# Patient Record
Sex: FEMALE | Race: BLACK OR AFRICAN AMERICAN | NOT HISPANIC OR LATINO | Employment: FULL TIME
[De-identification: names, ages, dates, MRNs, and addresses within clinical notes are randomized per-mention and may not be internally consistent; named-entity substitution may affect disease eponyms.]

---

## 2017-09-24 ENCOUNTER — HEALTH MAINTENANCE LETTER (OUTPATIENT)
Age: 52
End: 2017-09-24

## 2020-09-16 ENCOUNTER — APPOINTMENT (OUTPATIENT)
Dept: CT IMAGING | Facility: CLINIC | Age: 55
End: 2020-09-16
Attending: EMERGENCY MEDICINE
Payer: COMMERCIAL

## 2020-09-16 ENCOUNTER — HOSPITAL ENCOUNTER (EMERGENCY)
Facility: CLINIC | Age: 55
Discharge: HOME | End: 2020-09-16
Attending: EMERGENCY MEDICINE | Admitting: EMERGENCY MEDICINE
Payer: COMMERCIAL

## 2020-09-16 VITALS
RESPIRATION RATE: 18 BRPM | SYSTOLIC BLOOD PRESSURE: 130 MMHG | TEMPERATURE: 97.5 F | WEIGHT: 209.88 LBS | OXYGEN SATURATION: 98 % | HEIGHT: 65 IN | BODY MASS INDEX: 34.97 KG/M2 | DIASTOLIC BLOOD PRESSURE: 79 MMHG | HEART RATE: 67 BPM

## 2020-09-16 DIAGNOSIS — R11.2 NAUSEA VOMITING AND DIARRHEA: ICD-10-CM

## 2020-09-16 DIAGNOSIS — K52.9 NON-SPECIFIC COLITIS: ICD-10-CM

## 2020-09-16 DIAGNOSIS — R19.7 NAUSEA VOMITING AND DIARRHEA: ICD-10-CM

## 2020-09-16 LAB
ALBUMIN SERPL-MCNC: 4.1 G/DL (ref 3.4–5)
ALP SERPL-CCNC: 116 U/L (ref 40–150)
ALT SERPL W P-5'-P-CCNC: 21 U/L (ref 0–50)
ANION GAP SERPL CALCULATED.3IONS-SCNC: 8 MMOL/L (ref 3–14)
AST SERPL W P-5'-P-CCNC: 20 U/L (ref 0–45)
BASOPHILS # BLD AUTO: 0 10E9/L (ref 0–0.2)
BASOPHILS NFR BLD AUTO: 0.2 %
BILIRUB SERPL-MCNC: 0.5 MG/DL (ref 0.2–1.3)
BUN SERPL-MCNC: 15 MG/DL (ref 7–30)
CALCIUM SERPL-MCNC: 9.5 MG/DL (ref 8.5–10.1)
CHLORIDE SERPL-SCNC: 104 MMOL/L (ref 94–109)
CO2 SERPL-SCNC: 26 MMOL/L (ref 20–32)
CREAT SERPL-MCNC: 0.78 MG/DL (ref 0.52–1.04)
DIFFERENTIAL METHOD BLD: NORMAL
EOSINOPHIL # BLD AUTO: 0 10E9/L (ref 0–0.7)
EOSINOPHIL NFR BLD AUTO: 0.2 %
ERYTHROCYTE [DISTWIDTH] IN BLOOD BY AUTOMATED COUNT: 13.2 % (ref 10–15)
GFR SERPL CREATININE-BSD FRML MDRD: 86 ML/MIN/{1.73_M2}
GLUCOSE SERPL-MCNC: 123 MG/DL (ref 70–99)
HCT VFR BLD AUTO: 41.7 % (ref 35–47)
HGB BLD-MCNC: 14 G/DL (ref 11.7–15.7)
IMM GRANULOCYTES # BLD: 0 10E9/L (ref 0–0.4)
IMM GRANULOCYTES NFR BLD: 0.2 %
LIPASE SERPL-CCNC: 110 U/L (ref 73–393)
LYMPHOCYTES # BLD AUTO: 1.3 10E9/L (ref 0.8–5.3)
LYMPHOCYTES NFR BLD AUTO: 13.7 %
MCH RBC QN AUTO: 29.9 PG (ref 26.5–33)
MCHC RBC AUTO-ENTMCNC: 33.6 G/DL (ref 31.5–36.5)
MCV RBC AUTO: 89 FL (ref 78–100)
MONOCYTES # BLD AUTO: 0.4 10E9/L (ref 0–1.3)
MONOCYTES NFR BLD AUTO: 4.1 %
NEUTROPHILS # BLD AUTO: 7.6 10E9/L (ref 1.6–8.3)
NEUTROPHILS NFR BLD AUTO: 81.6 %
NRBC # BLD AUTO: 0 10*3/UL
NRBC BLD AUTO-RTO: 0 /100
PLATELET # BLD AUTO: 395 10E9/L (ref 150–450)
POTASSIUM SERPL-SCNC: 3.5 MMOL/L (ref 3.4–5.3)
PROT SERPL-MCNC: 8.8 G/DL (ref 6.8–8.8)
RBC # BLD AUTO: 4.69 10E12/L (ref 3.8–5.2)
SODIUM SERPL-SCNC: 138 MMOL/L (ref 133–144)
TSH SERPL DL<=0.005 MIU/L-ACNC: 1.35 MU/L (ref 0.4–4)
WBC # BLD AUTO: 9.3 10E9/L (ref 4–11)

## 2020-09-16 PROCEDURE — 25000125 ZZHC RX 250: Performed by: EMERGENCY MEDICINE

## 2020-09-16 PROCEDURE — 99285 EMERGENCY DEPT VISIT HI MDM: CPT | Mod: 25

## 2020-09-16 PROCEDURE — 80053 COMPREHEN METABOLIC PANEL: CPT | Performed by: EMERGENCY MEDICINE

## 2020-09-16 PROCEDURE — 96374 THER/PROPH/DIAG INJ IV PUSH: CPT | Mod: 59

## 2020-09-16 PROCEDURE — 25000132 ZZH RX MED GY IP 250 OP 250 PS 637: Performed by: EMERGENCY MEDICINE

## 2020-09-16 PROCEDURE — 96375 TX/PRO/DX INJ NEW DRUG ADDON: CPT

## 2020-09-16 PROCEDURE — 25800030 ZZH RX IP 258 OP 636: Performed by: EMERGENCY MEDICINE

## 2020-09-16 PROCEDURE — 83690 ASSAY OF LIPASE: CPT | Performed by: EMERGENCY MEDICINE

## 2020-09-16 PROCEDURE — 25000128 H RX IP 250 OP 636: Performed by: EMERGENCY MEDICINE

## 2020-09-16 PROCEDURE — 96361 HYDRATE IV INFUSION ADD-ON: CPT

## 2020-09-16 PROCEDURE — 85025 COMPLETE CBC W/AUTO DIFF WBC: CPT | Performed by: EMERGENCY MEDICINE

## 2020-09-16 PROCEDURE — 84443 ASSAY THYROID STIM HORMONE: CPT | Performed by: EMERGENCY MEDICINE

## 2020-09-16 PROCEDURE — 74177 CT ABD & PELVIS W/CONTRAST: CPT

## 2020-09-16 RX ORDER — ONDANSETRON 2 MG/ML
4 INJECTION INTRAMUSCULAR; INTRAVENOUS ONCE
Status: COMPLETED | OUTPATIENT
Start: 2020-09-16 | End: 2020-09-16

## 2020-09-16 RX ORDER — ONDANSETRON 4 MG/1
4 TABLET, ORALLY DISINTEGRATING ORAL EVERY 6 HOURS PRN
Qty: 10 TABLET | Refills: 0 | Status: ON HOLD | OUTPATIENT
Start: 2020-09-16 | End: 2020-09-18

## 2020-09-16 RX ORDER — DICYCLOMINE HCL 20 MG
20 TABLET ORAL ONCE
Status: COMPLETED | OUTPATIENT
Start: 2020-09-16 | End: 2020-09-16

## 2020-09-16 RX ORDER — HYDROMORPHONE HYDROCHLORIDE 1 MG/ML
0.5 INJECTION, SOLUTION INTRAMUSCULAR; INTRAVENOUS; SUBCUTANEOUS ONCE
Status: COMPLETED | OUTPATIENT
Start: 2020-09-16 | End: 2020-09-16

## 2020-09-16 RX ORDER — IOPAMIDOL 755 MG/ML
105 INJECTION, SOLUTION INTRAVASCULAR ONCE
Status: COMPLETED | OUTPATIENT
Start: 2020-09-16 | End: 2020-09-16

## 2020-09-16 RX ORDER — KETOROLAC TROMETHAMINE 15 MG/ML
15 INJECTION, SOLUTION INTRAMUSCULAR; INTRAVENOUS ONCE
Status: COMPLETED | OUTPATIENT
Start: 2020-09-16 | End: 2020-09-16

## 2020-09-16 RX ADMIN — LIDOCAINE HYDROCHLORIDE 30 ML: 20 SOLUTION ORAL; TOPICAL at 15:45

## 2020-09-16 RX ADMIN — DICYCLOMINE HYDROCHLORIDE 20 MG: 20 TABLET ORAL at 17:13

## 2020-09-16 RX ADMIN — HYDROMORPHONE HYDROCHLORIDE 0.5 MG: 1 INJECTION, SOLUTION INTRAMUSCULAR; INTRAVENOUS; SUBCUTANEOUS at 18:02

## 2020-09-16 RX ADMIN — SODIUM CHLORIDE 70 ML: 9 INJECTION, SOLUTION INTRAVENOUS at 17:34

## 2020-09-16 RX ADMIN — SODIUM CHLORIDE 1000 ML: 9 INJECTION, SOLUTION INTRAVENOUS at 14:46

## 2020-09-16 RX ADMIN — IOPAMIDOL 105 ML: 755 INJECTION, SOLUTION INTRAVENOUS at 17:34

## 2020-09-16 RX ADMIN — ONDANSETRON 4 MG: 2 INJECTION INTRAMUSCULAR; INTRAVENOUS at 14:49

## 2020-09-16 RX ADMIN — KETOROLAC TROMETHAMINE 15 MG: 15 INJECTION, SOLUTION INTRAMUSCULAR; INTRAVENOUS at 14:46

## 2020-09-16 ASSESSMENT — ENCOUNTER SYMPTOMS
VOMITING: 1
NAUSEA: 1
FEVER: 0
ABDOMINAL DISTENTION: 1
DIARRHEA: 1

## 2020-09-16 ASSESSMENT — MIFFLIN-ST. JEOR: SCORE: 1557.88

## 2020-09-16 NOTE — ED TRIAGE NOTES
Per daughter with pt pt has been vomiting, nauseated, and stomach pain since last night. Sx similar to a hyperthyroid crisis 4 years ago

## 2020-09-16 NOTE — ED AVS SNAPSHOT
Emergency Department  [address] [place] [id number]  Phone:  [phone]  Fax:  [phone]                                    Anna Martinez   MRN: [id number]    Department:   Emergency Department   Date of Visit:  9/16/2020           After Visit Summary Signature Page    I have received my discharge instructions, and my questions have been answered. I have discussed any challenges I see with this plan with the nurse or doctor.    ..........................................................................................................................................  Patient/Patient Representative Signature      ..........................................................................................................................................  Patient Representative Print Name and Relationship to Patient    ..................................................               ................................................  Date                                   Time    ..........................................................................................................................................  Reviewed by Signature/Title    ...................................................              ..............................................  Date                                               Time          22EPIC Rev 08/18

## 2020-09-16 NOTE — DISCHARGE INSTRUCTIONS
Return to the emergency department or seek medical care as instructed if your symptoms fail to improve or significantly worsen.    Take Acetaminophen (aka Tylenol) and/or ibuprofen (aka Motrin/Advil) as needed for symptom/pain relief; use as directed.    Take zofran as need for nausea; use as directed.    Take Imodium as needed for diarrhea    Follow-up as indicated on page 1.  Maintain adequate hydration and get plenty of rest.

## 2020-09-16 NOTE — ED PROVIDER NOTES
History   Chief Complaint  Nausea, Vomiting, & Diarrhea    The history is provided by the patient.      Anna Martinez is a 55 year old female with a history of hyperthyroidism who presents for evaluation of mid abdominal pain accompanied by nausea, emesis, and watery, loose diarrhea that began in the middle of the night last night. She rates the pain as an 8/10 in severity. She originally thought this was food poisoning, however that pain has continued to increase in severity.  The patient states she has been emitting frequently and had 4 episodes of emesis in triage. She only coughs when emitting. She has also recently started menopause and has been going through 1 tampon every 3 hours for the past 4 days. She denies fevers or hematemesis. No known sick contact. The patient states this feels similar to her hyperthyroidism episode.     Allergies  No Known Allergies    Medications  The patient is not currently taking any prescribed medications.    Past Medical History  Hyperthyroidism  Adjustment disorder  Scabies  Grave's disease     Past Surgical History  History reviewed. No pertinent surgical history.    Family History  History reviewed. No pertinent family history.    Social History  The patient was unaccompanied to the ED.  Smoking Status: light smoker, cigars and pipe  Smokeless Tobacco: never  Alcohol Use: no  Drug Use: no    Review of Systems   Constitutional: Negative for fever.   Gastrointestinal: Positive for abdominal distention, diarrhea, nausea and vomiting.   Genitourinary: Positive for vaginal bleeding.   All other systems reviewed and are negative.    Physical Exam     Patient Vitals for the past 24 hrs:   BP Temp Temp src Pulse Resp SpO2 Height Weight   09/16/20 1840 -- -- -- -- -- 97 % -- --   09/16/20 1749 -- -- -- -- -- 100 % -- --   09/16/20 1748 (!) 158/71 -- -- 63 -- -- -- --   09/16/20 1700 -- -- -- -- 18 100 % -- --   09/16/20 1600 -- -- -- -- -- 100 % -- --   09/16/20 1341 135/70 97.5  " F (36.4  C) Oral 81 28 97 % 1.651 m (5' 5\") 95.2 kg (209 lb 14.1 oz)       Physical Exam    General: Alert and cooperative with exam. Patient in moderate distress. Normal mentation.  Head:  Scalp is NC/AT  Eyes:  No scleral icterus, PERRL  ENT:  The external nose and ears are normal. The oropharynx is normal and without erythema; mucus membranes are moist.  Neck:  Normal range of motion without rigidity.  CV:  Regular rate and rhythm    No pathologic murmur   Resp:  Breath sounds are clear bilaterally    Non-labored, no retractions or accessory muscle use  GI:  Mild diffuse abdominal tenderness greatest in epigastric area. Abdomen is soft, no distension. No peritoneal signs  MS:  No lower extremity edema   Skin:  Warm and dry, No rash or lesions noted.  Neuro: Oriented x 3. No gross motor deficits.    Emergency Department Course   Imaging:  Radiology findings were communicated with the patient who voiced understanding of the findings.    CT abdomen pelvis w contrast:  IMPRESSION:   1.  Diffuse colonic inflammation consistent with an infectious or  inflammatory colitis. No associated abscess or free intraperitoneal  air. No other acute changes in the abdomen or pelvis to account for  patient's symptoms.   Readings per Radiology    Laboratory:  Laboratory findings were communicated with the patient who voiced understanding of the findings.    CBC: AWNL (WBC 9.3, HGB 14.0, )  CMP: glucose 123 (H) o/w WNL (Creatinine 0.78)  Lipase: 110  TSH with free T4 reflex: 1.35    Interventions:  1446 NS 1L IV Bolus  1446 Toradol 15 mg IV  1449 Zofran 4 mg IV  1545 GI cocktail 30 mL PO  1713 Bentyl 20 mg PO  1802 Dilaudid 0.5 mg IV    Emergency Department Course:  Past medical records, nursing notes, and vitals reviewed.    1434 I physically examined the patient as documented above.    IV was inserted and blood was drawn for laboratory testing, results above.    The patient was sent for radiographs while in the emergency " department, results above.     1835 I rechecked the patient and discussed the findings of their workup thus far.     Findings and plan explained to the Patient. Patient discharged home with instructions regarding supportive care, medications, and reasons to return. The importance of close follow-up was reviewed. The patient was prescribed Zofran.     I personally reviewed the laboratory and imaging results with the Patient and answered all related questions prior to discharge.     Impression & Plan   Medical Decision Making:  This patient presented to the Emergency Department with nausea and vomiting.  The clinical exam today is non-specific and non-focal and non-surgical.  The laboratory testing has returned normal.  The differential diagnosis of nausea is protean and includes:  Bowel Obstruction, Ulcer, Ischemia, Cholecystitis, Diverticulitis, Pancreatitis, UTI, Pyelonephritis, Enteritis/Colitis, amongst many other etiologies.  The sequela of vomiting includes electrolyte abnormalities and dehydration.  Fluids were given and labs were normal.  The history, physical exam, and results detect no life threatening cause at this time, nor do they indicate the patient is currently suffering from one of the previously mentioned conditions.  Patient was provided Zofran, GI cocktail, Bentyl, and Toradol with mild improvement in symptoms.  She then experienced complete resolution of pain after receiving Dilaudid.  CT scan was obtained and demonstrates nonspecific colitis as noted above.  I recommended continued supportive care and provided prescription for Zofran for nausea/vomiting.  Patient is to follow-up closely with PCP if symptoms not improving.  Return precautions discussed.  Patient discharged home.    Diagnosis:    ICD-10-CM    1. Nausea vomiting and diarrhea  R11.2     R19.7    2. Non-specific colitis  K52.9      Disposition:  Discharged to home.    Discharge Medications:  New Prescriptions    ONDANSETRON (ZOFRAN  ODT) 4 MG ODT TAB    Take 1 tablet (4 mg) by mouth every 6 hours as needed for nausea or vomiting       Scribe Disclosure:  I, Antonia Yepez, am serving as a scribe at 2:32 PM on 9/16/2020 to document services personally performed by Alexx Cooper DO based on my observations and the provider's statements to me.      Alexx Cooper DO  09/17/20 [id number]

## 2020-09-18 ENCOUNTER — HOSPITAL ENCOUNTER (OUTPATIENT)
Facility: CLINIC | Age: 55
Setting detail: OBSERVATION
Discharge: HOME | End: 2020-09-20
Attending: EMERGENCY MEDICINE | Admitting: INTERNAL MEDICINE
Payer: COMMERCIAL

## 2020-09-18 DIAGNOSIS — K52.9 COLITIS: ICD-10-CM

## 2020-09-18 DIAGNOSIS — R11.2 INTRACTABLE VOMITING WITH NAUSEA, UNSPECIFIED VOMITING TYPE: ICD-10-CM

## 2020-09-18 LAB
ALBUMIN SERPL-MCNC: 3.5 G/DL (ref 3.4–5)
ALP SERPL-CCNC: 81 U/L (ref 40–150)
ALT SERPL W P-5'-P-CCNC: 19 U/L (ref 0–50)
ANION GAP SERPL CALCULATED.3IONS-SCNC: 6 MMOL/L (ref 3–14)
AST SERPL W P-5'-P-CCNC: 19 U/L (ref 0–45)
BASOPHILS # BLD AUTO: 0 10E9/L (ref 0–0.2)
BASOPHILS NFR BLD AUTO: 0.4 %
BILIRUB SERPL-MCNC: 1.3 MG/DL (ref 0.2–1.3)
BUN SERPL-MCNC: 9 MG/DL (ref 7–30)
CALCIUM SERPL-MCNC: 8.7 MG/DL (ref 8.5–10.1)
CHLORIDE SERPL-SCNC: 103 MMOL/L (ref 94–109)
CO2 SERPL-SCNC: 25 MMOL/L (ref 20–32)
CREAT SERPL-MCNC: 0.82 MG/DL (ref 0.52–1.04)
DIFFERENTIAL METHOD BLD: NORMAL
EOSINOPHIL # BLD AUTO: 0 10E9/L (ref 0–0.7)
EOSINOPHIL NFR BLD AUTO: 0.1 %
ERYTHROCYTE [DISTWIDTH] IN BLOOD BY AUTOMATED COUNT: 13 % (ref 10–15)
GFR SERPL CREATININE-BSD FRML MDRD: 81 ML/MIN/{1.73_M2}
GLUCOSE SERPL-MCNC: 122 MG/DL (ref 70–99)
HCT VFR BLD AUTO: 37.7 % (ref 35–47)
HGB BLD-MCNC: 13 G/DL (ref 11.7–15.7)
IMM GRANULOCYTES # BLD: 0 10E9/L (ref 0–0.4)
IMM GRANULOCYTES NFR BLD: 0.1 %
LACTATE BLD-SCNC: 1.4 MMOL/L (ref 0.7–2)
LIPASE SERPL-CCNC: 132 U/L (ref 73–393)
LYMPHOCYTES # BLD AUTO: 2 10E9/L (ref 0.8–5.3)
LYMPHOCYTES NFR BLD AUTO: 28.3 %
MCH RBC QN AUTO: 30.4 PG (ref 26.5–33)
MCHC RBC AUTO-ENTMCNC: 34.5 G/DL (ref 31.5–36.5)
MCV RBC AUTO: 88 FL (ref 78–100)
MONOCYTES # BLD AUTO: 0.6 10E9/L (ref 0–1.3)
MONOCYTES NFR BLD AUTO: 7.8 %
NEUTROPHILS # BLD AUTO: 4.6 10E9/L (ref 1.6–8.3)
NEUTROPHILS NFR BLD AUTO: 63.3 %
NRBC # BLD AUTO: 0 10*3/UL
NRBC BLD AUTO-RTO: 0 /100
PLATELET # BLD AUTO: 360 10E9/L (ref 150–450)
POTASSIUM SERPL-SCNC: 3.3 MMOL/L (ref 3.4–5.3)
PROT SERPL-MCNC: 7.7 G/DL (ref 6.8–8.8)
RBC # BLD AUTO: 4.27 10E12/L (ref 3.8–5.2)
SODIUM SERPL-SCNC: 134 MMOL/L (ref 133–144)
WBC # BLD AUTO: 7.2 10E9/L (ref 4–11)

## 2020-09-18 PROCEDURE — 96361 HYDRATE IV INFUSION ADD-ON: CPT

## 2020-09-18 PROCEDURE — 96374 THER/PROPH/DIAG INJ IV PUSH: CPT

## 2020-09-18 PROCEDURE — 85025 COMPLETE CBC W/AUTO DIFF WBC: CPT | Performed by: EMERGENCY MEDICINE

## 2020-09-18 PROCEDURE — 25000132 ZZH RX MED GY IP 250 OP 250 PS 637: Performed by: INTERNAL MEDICINE

## 2020-09-18 PROCEDURE — G0378 HOSPITAL OBSERVATION PER HR: HCPCS

## 2020-09-18 PROCEDURE — C9803 HOPD COVID-19 SPEC COLLECT: HCPCS

## 2020-09-18 PROCEDURE — 96375 TX/PRO/DX INJ NEW DRUG ADDON: CPT

## 2020-09-18 PROCEDURE — 83690 ASSAY OF LIPASE: CPT | Performed by: EMERGENCY MEDICINE

## 2020-09-18 PROCEDURE — 99285 EMERGENCY DEPT VISIT HI MDM: CPT | Mod: 25

## 2020-09-18 PROCEDURE — 96376 TX/PRO/DX INJ SAME DRUG ADON: CPT

## 2020-09-18 PROCEDURE — 80053 COMPREHEN METABOLIC PANEL: CPT | Performed by: EMERGENCY MEDICINE

## 2020-09-18 PROCEDURE — 25000128 H RX IP 250 OP 636: Performed by: INTERNAL MEDICINE

## 2020-09-18 PROCEDURE — U0003 INFECTIOUS AGENT DETECTION BY NUCLEIC ACID (DNA OR RNA); SEVERE ACUTE RESPIRATORY SYNDROME CORONAVIRUS 2 (SARS-COV-2) (CORONAVIRUS DISEASE [COVID-19]), AMPLIFIED PROBE TECHNIQUE, MAKING USE OF HIGH THROUGHPUT TECHNOLOGIES AS DESCRIBED BY CMS-2020-01-R: HCPCS | Performed by: EMERGENCY MEDICINE

## 2020-09-18 PROCEDURE — 99220 ZZC INITIAL OBSERVATION CARE,LEVL III: CPT | Performed by: INTERNAL MEDICINE

## 2020-09-18 PROCEDURE — 83605 ASSAY OF LACTIC ACID: CPT | Performed by: EMERGENCY MEDICINE

## 2020-09-18 PROCEDURE — 25000132 ZZH RX MED GY IP 250 OP 250 PS 637: Performed by: EMERGENCY MEDICINE

## 2020-09-18 PROCEDURE — 25000128 H RX IP 250 OP 636: Performed by: EMERGENCY MEDICINE

## 2020-09-18 PROCEDURE — 25800030 ZZH RX IP 258 OP 636: Performed by: EMERGENCY MEDICINE

## 2020-09-18 RX ORDER — ACETAMINOPHEN 500 MG
1000 TABLET ORAL EVERY 6 HOURS PRN
Qty: 30 TABLET | Refills: 0 | Status: SHIPPED | OUTPATIENT
Start: 2020-09-18 | End: 2020-09-25

## 2020-09-18 RX ORDER — POTASSIUM CL/LIDO/0.9 % NACL 10MEQ/0.1L
10 INTRAVENOUS SOLUTION, PIGGYBACK (ML) INTRAVENOUS
Status: DISCONTINUED | OUTPATIENT
Start: 2020-09-18 | End: 2020-09-20 | Stop reason: HOSPADM

## 2020-09-18 RX ORDER — LIDOCAINE 40 MG/G
CREAM TOPICAL
Status: DISCONTINUED | OUTPATIENT
Start: 2020-09-18 | End: 2020-09-20 | Stop reason: HOSPADM

## 2020-09-18 RX ORDER — NICOTINE 21 MG/24HR
1 PATCH, TRANSDERMAL 24 HOURS TRANSDERMAL DAILY
Status: DISCONTINUED | OUTPATIENT
Start: 2020-09-18 | End: 2020-09-20 | Stop reason: HOSPADM

## 2020-09-18 RX ORDER — SODIUM CHLORIDE 9 MG/ML
INJECTION, SOLUTION INTRAVENOUS CONTINUOUS
Status: DISCONTINUED | OUTPATIENT
Start: 2020-09-18 | End: 2020-09-18

## 2020-09-18 RX ORDER — POTASSIUM CHLORIDE 1.5 G/1.58G
20-40 POWDER, FOR SOLUTION ORAL
Status: DISCONTINUED | OUTPATIENT
Start: 2020-09-18 | End: 2020-09-20 | Stop reason: HOSPADM

## 2020-09-18 RX ORDER — HYDROCODONE BITARTRATE AND ACETAMINOPHEN 5; 325 MG/1; MG/1
2 TABLET ORAL ONCE
Status: COMPLETED | OUTPATIENT
Start: 2020-09-18 | End: 2020-09-18

## 2020-09-18 RX ORDER — ONDANSETRON 4 MG/1
4 TABLET, ORALLY DISINTEGRATING ORAL EVERY 8 HOURS PRN
Qty: 10 TABLET | Refills: 0 | Status: SHIPPED | OUTPATIENT
Start: 2020-09-18 | End: 2020-09-20

## 2020-09-18 RX ORDER — CIPROFLOXACIN 2 MG/ML
400 INJECTION, SOLUTION INTRAVENOUS EVERY 12 HOURS
Status: DISCONTINUED | OUTPATIENT
Start: 2020-09-18 | End: 2020-09-20 | Stop reason: HOSPADM

## 2020-09-18 RX ORDER — OXYCODONE HYDROCHLORIDE 5 MG/1
5-10 TABLET ORAL
Status: DISCONTINUED | OUTPATIENT
Start: 2020-09-18 | End: 2020-09-20 | Stop reason: HOSPADM

## 2020-09-18 RX ORDER — ONDANSETRON 2 MG/ML
4 INJECTION INTRAMUSCULAR; INTRAVENOUS EVERY 30 MIN PRN
Status: DISCONTINUED | OUTPATIENT
Start: 2020-09-18 | End: 2020-09-18

## 2020-09-18 RX ORDER — ONDANSETRON 2 MG/ML
4 INJECTION INTRAMUSCULAR; INTRAVENOUS EVERY 6 HOURS PRN
Status: DISCONTINUED | OUTPATIENT
Start: 2020-09-18 | End: 2020-09-20 | Stop reason: HOSPADM

## 2020-09-18 RX ORDER — ACETAMINOPHEN 325 MG/1
650 TABLET ORAL EVERY 4 HOURS PRN
Status: DISCONTINUED | OUTPATIENT
Start: 2020-09-18 | End: 2020-09-20 | Stop reason: HOSPADM

## 2020-09-18 RX ORDER — POTASSIUM CHLORIDE 7.45 MG/ML
10 INJECTION INTRAVENOUS
Status: DISCONTINUED | OUTPATIENT
Start: 2020-09-18 | End: 2020-09-20 | Stop reason: HOSPADM

## 2020-09-18 RX ORDER — AMOXICILLIN 250 MG
2 CAPSULE ORAL 2 TIMES DAILY PRN
Status: DISCONTINUED | OUTPATIENT
Start: 2020-09-18 | End: 2020-09-20 | Stop reason: HOSPADM

## 2020-09-18 RX ORDER — HYDROMORPHONE HYDROCHLORIDE 1 MG/ML
0.5 INJECTION, SOLUTION INTRAMUSCULAR; INTRAVENOUS; SUBCUTANEOUS
Status: COMPLETED | OUTPATIENT
Start: 2020-09-18 | End: 2020-09-18

## 2020-09-18 RX ORDER — NALOXONE HYDROCHLORIDE 0.4 MG/ML
.1-.4 INJECTION, SOLUTION INTRAMUSCULAR; INTRAVENOUS; SUBCUTANEOUS
Status: DISCONTINUED | OUTPATIENT
Start: 2020-09-18 | End: 2020-09-20 | Stop reason: HOSPADM

## 2020-09-18 RX ORDER — ONDANSETRON 4 MG/1
4 TABLET, ORALLY DISINTEGRATING ORAL EVERY 6 HOURS PRN
Status: DISCONTINUED | OUTPATIENT
Start: 2020-09-18 | End: 2020-09-20 | Stop reason: HOSPADM

## 2020-09-18 RX ORDER — ACETAMINOPHEN 650 MG/1
650 SUPPOSITORY RECTAL EVERY 4 HOURS PRN
Status: DISCONTINUED | OUTPATIENT
Start: 2020-09-18 | End: 2020-09-20 | Stop reason: HOSPADM

## 2020-09-18 RX ORDER — AMOXICILLIN 250 MG
1 CAPSULE ORAL 2 TIMES DAILY PRN
Status: DISCONTINUED | OUTPATIENT
Start: 2020-09-18 | End: 2020-09-20 | Stop reason: HOSPADM

## 2020-09-18 RX ORDER — METRONIDAZOLE 500 MG/1
500 TABLET ORAL 4 TIMES DAILY
Qty: 28 TABLET | Refills: 0 | Status: SHIPPED | OUTPATIENT
Start: 2020-09-18 | End: 2020-09-20

## 2020-09-18 RX ORDER — CIPROFLOXACIN 500 MG/1
500 TABLET, FILM COATED ORAL 2 TIMES DAILY
Qty: 14 TABLET | Refills: 0 | Status: SHIPPED | OUTPATIENT
Start: 2020-09-18 | End: 2020-09-20

## 2020-09-18 RX ORDER — POTASSIUM CHLORIDE 29.8 MG/ML
20 INJECTION INTRAVENOUS
Status: DISCONTINUED | OUTPATIENT
Start: 2020-09-18 | End: 2020-09-20 | Stop reason: HOSPADM

## 2020-09-18 RX ORDER — SODIUM CHLORIDE AND POTASSIUM CHLORIDE 150; 900 MG/100ML; MG/100ML
INJECTION, SOLUTION INTRAVENOUS CONTINUOUS
Status: DISCONTINUED | OUTPATIENT
Start: 2020-09-18 | End: 2020-09-20 | Stop reason: HOSPADM

## 2020-09-18 RX ORDER — POTASSIUM CHLORIDE 1500 MG/1
20-40 TABLET, EXTENDED RELEASE ORAL
Status: DISCONTINUED | OUTPATIENT
Start: 2020-09-18 | End: 2020-09-20 | Stop reason: HOSPADM

## 2020-09-18 RX ORDER — HYDROMORPHONE HYDROCHLORIDE 1 MG/ML
.2-.4 INJECTION, SOLUTION INTRAMUSCULAR; INTRAVENOUS; SUBCUTANEOUS
Status: DISCONTINUED | OUTPATIENT
Start: 2020-09-18 | End: 2020-09-20 | Stop reason: HOSPADM

## 2020-09-18 RX ADMIN — SODIUM CHLORIDE 1000 ML: 9 INJECTION, SOLUTION INTRAVENOUS at 08:44

## 2020-09-18 RX ADMIN — HYDROMORPHONE HYDROCHLORIDE 0.5 MG: 1 INJECTION, SOLUTION INTRAMUSCULAR; INTRAVENOUS; SUBCUTANEOUS at 08:44

## 2020-09-18 RX ADMIN — NICOTINE 1 PATCH: 14 PATCH, EXTENDED RELEASE TRANSDERMAL at 17:09

## 2020-09-18 RX ADMIN — POTASSIUM CHLORIDE AND SODIUM CHLORIDE: 900; 150 INJECTION, SOLUTION INTRAVENOUS at 18:11

## 2020-09-18 RX ADMIN — METRONIDAZOLE 500 MG: 500 INJECTION, SOLUTION INTRAVENOUS at 17:02

## 2020-09-18 RX ADMIN — METRONIDAZOLE 500 MG: 500 INJECTION, SOLUTION INTRAVENOUS at 22:21

## 2020-09-18 RX ADMIN — CIPROFLOXACIN 400 MG: 2 INJECTION, SOLUTION INTRAVENOUS at 13:32

## 2020-09-18 RX ADMIN — ONDANSETRON 4 MG: 2 INJECTION INTRAMUSCULAR; INTRAVENOUS at 08:44

## 2020-09-18 RX ADMIN — OXYCODONE HYDROCHLORIDE 10 MG: 5 TABLET ORAL at 22:20

## 2020-09-18 RX ADMIN — HYDROCODONE BITARTRATE AND ACETAMINOPHEN 2 TABLET: 5; 325 TABLET ORAL at 11:08

## 2020-09-18 RX ADMIN — OXYCODONE HYDROCHLORIDE 5 MG: 5 TABLET ORAL at 19:24

## 2020-09-18 ASSESSMENT — ENCOUNTER SYMPTOMS
ABDOMINAL PAIN: 1
DYSURIA: 0

## 2020-09-18 ASSESSMENT — MIFFLIN-ST. JEOR: SCORE: 1558.43

## 2020-09-18 NOTE — PROGRESS NOTES
Observation goals  PRIOR TO DISCHARGE     Comments: -diagnostic tests and consults completed and resulted Pending  -vital signs normal or at patient baseline Met  -tolerating oral intake to maintain hydration Met  Nurse to notify provider when observation goals have been met and patient is ready for discharge      Pt is stable, A/OX4. Independent in room. Minimal pain in abdominal area. Awaiting pelvis ultrasound. Getting IV abx. Will continue to monitor.

## 2020-09-18 NOTE — ED NOTES
"[facility]  ED Nurse Handoff Report    ED Chief complaint: Abdominal Pain      ED Diagnosis:   Final diagnoses:   Colitis   Intractable vomiting with nausea, unspecified vomiting type       Code Status: Full Code    Allergies: No Known Allergies    Patient Story: Patient was recently here  2 days ago and found to have colitis. Now having pain that is uncontrolled. Staying for observation.   Focused Assessment:      Treatments and/or interventions provided: Pain medications.   Patient's response to treatments and/or interventions: Pain improved with meds.     To be done/followed up on inpatient unit:  none    Does this patient have any cognitive concerns?: none    Activity level - Baseline/Home:  Independent  Activity Level - Current:   Independent    Patient's Preferred language: English   Needed?: No    Isolation: No  Infection: Not Applicable  Bariatric?: No    Vital Signs:   Vitals:    09/18/20 0801 09/18/20 0900 09/18/20 1002   BP: (!) 168/64  127/76   Pulse: 70     Resp: 18     Temp: 97.4  F (36.3  C)     TempSrc: Temporal     SpO2: 100% 98% 98%   Weight: 95.3 kg (210 lb)     Height: 1.651 m (5' 5\")         Cardiac Rhythm:     Was the PSS-3 completed:   Yes  What interventions are required if any?               Family Comments: none  OBS brochure/video discussed/provided to patient/family: Yes              Name of person given brochure if not patient: none              Relationship to patient: none    For the majority of the shift this patient's behavior was Green.   Behavioral interventions performed were none.    ED NURSE PHONE NUMBER: [phone]         "

## 2020-09-18 NOTE — ED NOTES
Patient has full relief of pain. Given crackers, apple juice and applesauce and patient attempting to eat.

## 2020-09-18 NOTE — PROGRESS NOTES
RECEIVING UNIT ED HANDOFF REVIEW    ED Nurse Handoff Report was reviewed by: OFELIA MICHEL RN on September 18, 2020 at 1:05 PM

## 2020-09-18 NOTE — ED PROVIDER NOTES
"History     Chief Complaint:  Abdominal Pain       The history is provided by the patient.     Anna Martinez is a 55 year old female with a history of hypothyroidism, Grave's disease, adjustment disorder, and scabies who presents for evaluation of abdominal pain. The patient reports that she has had constant non radiating abdominal pain for the past two days and she has been unable to get any relief. She denies vaginal bleeding or dysuria. The patient does note that she recently had her first period in over a year, with heavy bleeding. Of note, she was seen on 9/16 with the same symptoms and CT was obtained with findings below.     CT Abdomen and Pelvis with Contrast from 9/16/2020:  IMPRESSION:   1.  Diffuse colonic inflammation consistent with an infectious or  inflammatory colitis. No associated abscess or free intraperitoneal  air. No other acute changes in the abdomen or pelvis to account for  patient's symptoms.  Reading per radiology.    Allergies  No Known Allergies     Medications  The patient is not currently taking any prescribed medications.     Past Medical History  Hyperthyroidism  Adjustment disorder  Scabies  Grave's disease      Past Surgical History  History reviewed. No pertinent surgical history.     Family History  History reviewed. No pertinent family history.     Social History  The patient was unaccompanied to the ED.  Smoking Status: light smoker, cigars and pipe  Smokeless Tobacco: Never  Alcohol Use: No  Drug Use: No      Review of Systems   Gastrointestinal: Positive for abdominal pain.   Genitourinary: Negative for dysuria and vaginal bleeding.   All other systems reviewed and are negative.      Physical Exam     Patient Vitals for the past 24 hrs:   BP Temp Temp src Pulse Resp SpO2 Height Weight   09/18/20 0801 (!) 168/64 97.4  F (36.3  C) Temporal 70 18 100 % 1.651 m (5' 5\") 95.3 kg (210 lb)          Physical Exam  Vitals: reviewed by me  General: Pt seen on [facility], " pleasant, cooperative, and alert to conversation  Eyes: Tracking well, clear conjunctiva BL  ENT: MMM, midline trachea.   Lungs: No tachypnea, no accessory muscle use. No respiratory distress.   CV: Rate as above, regular rhythm.    Abd: Soft, mild tenderness to palpation throughout, but no focal area of significant tenderness.  No guarding, no rebound x4.  MSK: no peripheral edema or joint effusion.  No evidence of trauma  Skin: No rash, normal turgor and temperature  Neuro: Clear speech and no facial droop.  Psych: Not RIS, no e/o AH/VH      Emergency Department Course     Laboratory:  Laboratory findings were communicated with the patient who voiced understanding of the findings.    CBC: WBC 7.2, HGB 13.0,   CMP: Potassium 3.3 (L), Glucose 122 (H), (Creatinine 0.82) o/w WNL   Lipase: 132  Lactic Acid (Resulted 0854): 1.4    Asymptomatic COVID-19 (Coronavirus) PCR by Nasopharyngeal Swab: Pending      Interventions:   0844 Normal Saline 1000 mL IV   0844 Zofran 4 mg IV   0844 Dilaudid 0.5 mg IV   1108 Norco 325 mg, 2 tablets PO    Emergency Department Course:    0807 Nursing notes and vitals reviewed.    0820 I performed an exam of the patient as documented above.     0847 IV was inserted and blood was drawn for laboratory testing, results above.    Patient rechecked and updated.      1042 Patient rechecked and updated.      The patient's nose was swabbed and this sample was sent for COVID testing, findings above.      1210  I consulted with Dr. Adair of the hospitalist services. They are in agreement to accept the patient for admission. Findings and plan explained to the Patient who consents to admission. Discussed the patient with Dr. Adair, who will admit the patient to an observation bed for further monitoring, evaluation, and treatment.    Impression & Plan     Medical Decision Making:  She is a very pleasant 55 year old female who presents to the emergency room with abdominal pain with some vomiting,  diagnosed with non specific colitis on a CT scan several days ago. Here in the ER, after one dose of pain medication she feels comfortable going home, and is tolerating orals. Her abdomen is benign in all four quadrants at time of disposition.  I had initially intended on outpatient therapy, but patient tells me she does not feel comfortable with outpatient management as she is afraid she will have another very terrible night tonight as she did last night with her pain and possible chills.  With this in mind, we will plan for admission to the care of Dr. Curry, who is kindly agreed accept care of the patient.  I do think it is prudent to broaden our treatment strategy and give antibiotics to cover for possible infectious cause of her colitis, though it is reassuring that she has no fever or white count even days later.  Will plan for admission of the next available inpatient bed.    Covid-19  Anna Martinez was evaluated during a global COVID-19 pandemic, which necessitated consideration that the patient might be at risk for infection with the SARS-CoV-2 virus that causes COVID-19.   Applicable protocols for evaluation were followed during the patient's care.   COVID-19 was considered as part of the patient's evaluation. The plan for testing is:  a test was obtained during this visit.     Diagnosis:     ICD-10-CM    1. Colitis  K52.9    2. Intractable vomiting with nausea, unspecified vomiting type  R11.2         Disposition:  Admitted to Dr. Adair.    Scribe Disclosure:  Yumi SIERRA, am serving as a scribe at 8:13 AM on 9/18/2020 to document services personally performed by Chris Champion MD based on my observations and the provider's statements to me.      Chris Champion MD  09/18/20 [id number]

## 2020-09-18 NOTE — PHARMACY-ADMISSION MEDICATION HISTORY
Pharmacy Medication History  Admission medication history interview status for the 9/18/2020  admission is complete. See EPIC admission navigator for prior to admission medications     Medication history sources: Patient  Medication history source reliability: Good  Adherence assessment: Good    Significant changes made to the medication list:  Per patient she only takes multivitamin at home      Additional medication history information:       Medication reconciliation completed by provider prior to medication history? No    Time spent in this activity: 5min       Prior to Admission medications    Medication Sig Last Dose Taking? Auth Provider   acetaminophen (TYLENOL) 500 MG tablet Take 2 tablets (1,000 mg) by mouth every 6 hours as needed for mild pain  Yes Chris Champion MD   ciprofloxacin (CIPRO) 500 MG tablet Take 1 tablet (500 mg) by mouth 2 times daily for 7 days  Yes Chris Champion MD   metroNIDAZOLE (FLAGYL) 500 MG tablet Take 1 tablet (500 mg) by mouth 4 times daily for 7 days  Yes Chris Champion MD   Multiple Vitamins-Minerals (CENTRUM) TABS Take one tablet by mouth once daily 9/17/2020 at Unknown time Yes Gia Ortiz MD   ondansetron (ZOFRAN ODT) 4 MG ODT tab Take 1 tablet (4 mg) by mouth every 8 hours as needed  Yes Chris Champion MD Marci Jacobson PharmD

## 2020-09-18 NOTE — H&P
Admitted:     09/18/2020      CHIEF COMPLAINT:  Abdominal pain, nausea, vomiting and diarrhea.      HISTORY OF PRESENT ILLNESS:  History has been obtained from the patient who is a good historian.      Mrs. Anna Martinez is an extremely pleasant 55-year-old female with a past medical history of hyperthyroidism/Graves' disease status post iodine intake, who presented for evaluation of abdominal pain, nausea, vomiting, and she also reports having vaginal bleeding for the last few days.      The patient states that she had menopause for the last year or so.  She states that Saturday, while she was at work, she started having some abdominal cramping, and then she noticed having heavy vaginal bleeding.  She states that she did not have any vaginal bleeding for the last few months.  It felt like her menstrual cycle but heavier.      She reports that on Tuesday to Wednesday night, she started having GI symptoms of nausea, multiple episodes of emesis and watery loose diarrhea, along with ongoing abdominal pain/cramping.  She was seen in the ER on 09/16/2020.  Her vitals were stable at that time.  Blood work was unremarkable.  Her white blood cells were 9.3.  She had a CT of the abdomen and pelvis with IV contrast, which showed a diffuse colonic inflammation, consistent with infectious or inflammatory colitis.  Also, she has associated abscess and free intraperitoneal air.  She was given some IV fluids at that time, along with a GI cocktail, Zofran IV, 1 dose of Dilaudid IV and Toradol.  She reported feeling better.  She was discharged home with a prescription for Zofran p.r.n.      The patient states that she continued to have abdominal cramping associated with nausea and vomiting.  Actually, now she has more dry heaving.  She reports she cannot tolerate oral intake.  She continues to have significant diarrhea, maybe 6-7 times daily.  She describes it as watery.  She did see a small amount of blood in the stool a few days  ago but none since then.      Upon further questioning, she states that she did not check her temperature at home, but she did feel hot and sweaty at times.  She denies any chest pain, no headache.  She does report feeling some shortness of breath.  There is no reported dysuria, no leg swelling.      She presented again to the ER today because of these ongoing symptoms.  Of note, her vaginal bleeding seems to have slowed down since then.  She was seen by Dr. Champion.  Her vitals showed a blood pressure of 168/64; later on blood pressure was 127/76, heart rate 70, respiratory rate 18, oxygen saturation 98% on room air, temperature 97.4.  She did have done another set of blood work.  Her BMP showed a sodium of 134, potassium 3.3, chloride 103, bicarbonate 25, BUN 9, creatinine 0.82.  Her anion gap was 6, albumin 3.5, total protein 7.7, total bilirubin 1.3, alkaline phosphatase 81, ALT 19, AST 19.  Lactic acid 1.4.  Her lipase was 132, glucose 122.  White blood cells of 7.2, hemoglobin 13.0, hematocrit 37.7 and platelet count 360.  In ER, she received a bolus of normal saline, 1 dose of Zofran, 1 dose of Dilaudid IV, and 1 dose of Norco 2 tablets.  Initially, ER attending thought that she may be discharged from ER, but the patient did not feel well enough to go home, worrying that she might continue to have ongoing cramping, nausea, vomiting and diarrhea, and admission to the hospital for observation was recommended.      PAST MEDICAL HISTORY:   1.  Hyperthyroidism, status post iodine intake.  Her TSH 2 days ago was 1.35.   2.  Adjustment disorder.   3.  Tobacco use disorder.      PAST SURGICAL HISTORY:   No past surgical history on file.     FAMILY HISTORY:    Diabetes Brother       Breast Cancer Maternal Aunt       Diabetes Maternal Grandmother       Lung Cancer Maternal Grandmother       Diabetes Maternal Uncle       Diabetes Mother            SOCIAL HISTORY:  Currently, she smokes cigars/pipe.  She denies  illicit drug abuse.  She denies alcohol drinking.      PRIOR TO ADMISSION MEDICATIONS:    acetaminophen (TYLENOL) 500 MG tablet Take 2 tablets (1,000 mg) by mouth every 6 hours as needed for mild pain   Yes Chris Champion MD   Multiple Vitamins-Minerals (CENTRUM) TABS Take one tablet by mouth once daily 9/17/2020 at Unknown time Yes Gia Ortiz MD          ALLERGIES:  NO KNOWN DRUG ALLERGIES.      REVIEW OF SYSTEMS:  The 10-point review of systems was conducted, and it was negative except for pertinent positives mentioned in history of present illness.      PHYSICAL EXAMINATION:   VITAL SIGNS:  Blood pressure is 127/76, heart rate 70, respiratory rate 18, oxygen saturation 98% on room air and temperature 97.4.   GENERAL:  The patient is awake, alert, in no acute distress at the time of my examination.   HEENT:  Normocephalic, atraumatic.  Pupils are equally round and reactive to light.  Oral mucosa is dry.   NECK:  Supple.  No cervical lymphadenopathy, no thyromegaly.   CHEST:  There is bilateral air entry.  No wheezing, no rales, no crackles.   CARDIOVASCULAR:  There is normal S1, S2.  Regular rate and rhythm.  No murmurs, no rubs.   ABDOMEN:  Soft, nontender, nondistended.  Bowel sounds are present.   EXTREMITIES:  There is no leg swelling, no calf tenderness, 2+ peripheral pulses are palpable.   SKIN:  Intact, no rash, no cyanosis.   NEUROLOGIC:  The patient is awake, alert and oriented to self, place and time.  There are no focal neurological deficits.   PSYCHIATRIC:  Normal mood, normal affect.   MUSCULOSKELETAL:  She moves all extremities freely.  There are no obvious joint deformities.      LABORATORY DATA:  Reviewed and dictated above.      ASSESSMENT:  Mrs. Anna Martinez is a very pleasant 55-year-old female with a past medical history of hyperthyroidism/Graves' disease, asthma, and tobacco use disorder who came in for evaluation of nausea, vomiting, abdominal pain, and  diarrhea.      PLAN:   1.  Acute colitis, unclear etiology, infectious versus inflammatory colitis.      She reports that she started having abdominal cramping last Saturday, and then she started having nausea, multiple episodes of emesis and multiple episodes of diarrhea, starting Tuesday.  This is her second ER visit.  Initially, 2 days ago in the ER, her blood work was unremarkable, and CT of the abdomen and pelvis showed diffuse colonic inflammation, consistent with infectious or inflammatory colitis.  She denies sick contacts.  She was discharged home from ER.  At that time she was feeling better after initial intervention in the ER.  At home, she continued to have increased abdominal cramping, nausea, dry heaving and diarrhea.  The blood work here is again pretty unremarkable.  No fever, no leukocytosis.  Her potassium is 3.3.  Her lactic acid is 1.4, which is reassuring.  Given her ongoing symptoms, I am going to start her on IV Cipro and IV Flagyl at this time.  We will monitor fever curve and white blood cells.  We will start her on a clear liquid diet and advance as tolerated.  We will start her on IV fluids, antiemetics p.r.n., and pain medications p.r.n.  We will replace potassium as needed.   2.  Hypokalemia.  Potassium 3.3.  Potassium replacement protocol has been ordered.   3.  History of hyperthyroidism.  Her TSH is normal at 1.35.  She is not on any medications currently.   4.  Vaginal bleeding.  She reports being in menopause for almost 1 year.  She started having heavy vaginal bleeding last Saturday associated with this abdominal cramping.  She reported vaginal bleeding actually is slowing down now.  A CT of the abdomen and pelvis few days ago showed multiple small uterine fibroids.  No pelvic mass.  Her hemoglobin is 13, down from 14 two days ago.  Her platelet count is normal.  We will plan to have a pelvic ultrasound at this time, and she should follow-up with GYN as outpatient.  Her vaginal  bleeding might be related to her fibroids.   5.  Tobacco use disorder.  I strongly encouraged her to quit smoking.  A nicotine patch had been ordered at this time.   6.  Deep venous thrombosis prophylaxis:  Encouraged ambulation as I anticipate a short hospital stay.      CODE STATUS:  Discussed with the patient, and patient is full code.      DISPOSITION:  Admit to observational status.  Anticipate patient may be discharged in next 1-2 days, pending improvement of her GI symptoms and tolerating diet.         ALEXSANDRA PETE MD             D: 2020   T: 2020   MT:       Name:     ELIJAH ALCALA   MRN:      [id number]        Account:      [id number]   :      1965        Admitted:     2020                   Document: [id number]       cc: Ana CHAMBERLAIN

## 2020-09-19 ENCOUNTER — APPOINTMENT (OUTPATIENT)
Dept: ULTRASOUND IMAGING | Facility: CLINIC | Age: 55
End: 2020-09-19
Attending: INTERNAL MEDICINE
Payer: COMMERCIAL

## 2020-09-19 LAB
ANION GAP SERPL CALCULATED.3IONS-SCNC: 2 MMOL/L (ref 3–14)
BUN SERPL-MCNC: 7 MG/DL (ref 7–30)
CALCIUM SERPL-MCNC: 8.6 MG/DL (ref 8.5–10.1)
CHLORIDE SERPL-SCNC: 109 MMOL/L (ref 94–109)
CO2 SERPL-SCNC: 29 MMOL/L (ref 20–32)
CREAT SERPL-MCNC: 0.79 MG/DL (ref 0.52–1.04)
ERYTHROCYTE [DISTWIDTH] IN BLOOD BY AUTOMATED COUNT: 13.2 % (ref 10–15)
GFR SERPL CREATININE-BSD FRML MDRD: 85 ML/MIN/{1.73_M2}
GLUCOSE BLDC GLUCOMTR-MCNC: 88 MG/DL (ref 70–99)
GLUCOSE SERPL-MCNC: 105 MG/DL (ref 70–99)
HCT VFR BLD AUTO: 35.7 % (ref 35–47)
HGB BLD-MCNC: 11.7 G/DL (ref 11.7–15.7)
MCH RBC QN AUTO: 29.4 PG (ref 26.5–33)
MCHC RBC AUTO-ENTMCNC: 32.8 G/DL (ref 31.5–36.5)
MCV RBC AUTO: 90 FL (ref 78–100)
PLATELET # BLD AUTO: 342 10E9/L (ref 150–450)
POTASSIUM SERPL-SCNC: 3.5 MMOL/L (ref 3.4–5.3)
RBC # BLD AUTO: 3.98 10E12/L (ref 3.8–5.2)
SARS-COV-2 RNA SPEC QL NAA+PROBE: NOT DETECTED
SODIUM SERPL-SCNC: 140 MMOL/L (ref 133–144)
SPECIMEN SOURCE: NORMAL
WBC # BLD AUTO: 7.8 10E9/L (ref 4–11)

## 2020-09-19 PROCEDURE — 25000128 H RX IP 250 OP 636: Performed by: INTERNAL MEDICINE

## 2020-09-19 PROCEDURE — 00000146 ZZHCL STATISTIC GLUCOSE BY METER IP

## 2020-09-19 PROCEDURE — 25000132 ZZH RX MED GY IP 250 OP 250 PS 637: Performed by: INTERNAL MEDICINE

## 2020-09-19 PROCEDURE — 36415 COLL VENOUS BLD VENIPUNCTURE: CPT | Performed by: INTERNAL MEDICINE

## 2020-09-19 PROCEDURE — 85027 COMPLETE CBC AUTOMATED: CPT | Performed by: INTERNAL MEDICINE

## 2020-09-19 PROCEDURE — 76856 US EXAM PELVIC COMPLETE: CPT

## 2020-09-19 PROCEDURE — G0378 HOSPITAL OBSERVATION PER HR: HCPCS

## 2020-09-19 PROCEDURE — 99226 ZZC SUBSEQUENT OBSERVATION CARE,LEVEL III: CPT | Performed by: INTERNAL MEDICINE

## 2020-09-19 PROCEDURE — 80048 BASIC METABOLIC PNL TOTAL CA: CPT | Performed by: INTERNAL MEDICINE

## 2020-09-19 PROCEDURE — 96376 TX/PRO/DX INJ SAME DRUG ADON: CPT

## 2020-09-19 RX ADMIN — METRONIDAZOLE 500 MG: 500 INJECTION, SOLUTION INTRAVENOUS at 15:21

## 2020-09-19 RX ADMIN — NICOTINE 1 PATCH: 14 PATCH, EXTENDED RELEASE TRANSDERMAL at 10:24

## 2020-09-19 RX ADMIN — ACETAMINOPHEN 650 MG: 325 TABLET, FILM COATED ORAL at 17:11

## 2020-09-19 RX ADMIN — METRONIDAZOLE 500 MG: 500 INJECTION, SOLUTION INTRAVENOUS at 05:36

## 2020-09-19 RX ADMIN — ONDANSETRON 4 MG: 2 INJECTION INTRAMUSCULAR; INTRAVENOUS at 03:06

## 2020-09-19 RX ADMIN — CIPROFLOXACIN 400 MG: 2 INJECTION, SOLUTION INTRAVENOUS at 13:08

## 2020-09-19 RX ADMIN — METRONIDAZOLE 500 MG: 500 INJECTION, SOLUTION INTRAVENOUS at 21:31

## 2020-09-19 RX ADMIN — CIPROFLOXACIN 400 MG: 2 INJECTION, SOLUTION INTRAVENOUS at 01:41

## 2020-09-19 NOTE — PROGRESS NOTES
"[facility]    Medicine Progress Note - Hospitalist Service       Date of Admission:  9/18/2020  Assessment & Plan       Mrs. Anna Martinez is an extremely pleasant 55-year-old female with a past medical history of hyperthyroidism/Graves' disease status post iodine intake, who presented for evaluation of abdominal pain, nausea, vomiting, and she also reports having vaginal bleeding for the last few days.     1.  Acute colitis  - unclear etiology, infectious versus inflammatory colitis.   - started having abdominal cramping last Saturday, and then she started having nausea, multiple episodes of emesis and multiple episodes of diarrhea, starting Tuesday.  This is her second ER visit.  Initially, 2 days ago in the ER, her blood work was unremarkable, and CT of the abdomen and pelvis showed diffuse colonic inflammation, consistent with infectious or inflammatory colitis.  She denies sick contacts.  She was discharged home from ER.  At that time she was feeling better after initial intervention in the ER.  At home, she continued to have increased abdominal cramping, nausea, dry heaving and diarrhea.   - BW- pretty unremarkable; K 3.3; LA 1.4  - no fever, no leukocytosis  - Given her ongoing symptoms, started on IV Cipro and IV Flagyl on admission  - feeling slightly better today, had some diarrhea yesterday and nausea last night but improved; no BM today  - tolerating clear liquids diet but she \"is afraid to advance diet\"  - continue iv fluids NS with KCl at 100cc/h, antiemetics prn, pain meds prn  - continue with Cipro/flagyl iv for now, likely will switch to po route at the time of the discharge      2.  Hypokalemia.  - K 3.3 on admission   - K replacement protocol has been ordered.   - K today 3.5    3.  History of hyperthyroidism.   - TSH is normal at 1.35.   - not on any medications currently.     4.  Vaginal bleeding  5.  Fibroids   She reports being in menopause for almost 1 year.  She started " "having heavy vaginal bleeding last Saturday associated with this abdominal cramping.  She reported vaginal bleeding actually is slowing down now.  - CT of the abdomen and pelvis few days ago showed multiple small uterine fibroids.  No pelvic mass.   - US pelvis- Multiple uterine fibroids.  - states that the vaginal bleeding slowed brennon, almost stopped  - Hb 14--13--11.7 today; hemodilution likely contributing   - follow up with GYN as outpatient     6.  Tobacco use disorder.   - strongly encouraged her to quit smoking.  - nicotine patch ordered      Diet: Advance Diet as Tolerated: Clear Liquid Diet    DVT Prophylaxis: Pneumatic Compression Devices  Cutler Catheter: not present  Code Status: Full Code           Disposition Plan   Expected discharge: Tomorrow, recommended to prior living arrangement once adequate pain management/ tolerating PO medications.  Entered: Zulma Adair MD 09/19/2020, 4:29 PM       The patient's care was discussed with the Bedside Nurse and Patient.    Zulma Adair MD  Hospitalist Service  [facility]    ______________________________________________________________________    Interval History   States she is doing better, had some nausea last night but improved today, no diarrhea today, tolerating jell-o but states \"she is afraid to advance diet\"; she is relieved to hear that pelvic US showed fibroids and now she knows what caused her vaginal bleeding; I advised her to follow up with GYN.    r  Data reviewed today: I reviewed all medications, new labs and imaging results over the last 24 hours. I personally reviewed the pelvic US image(s) showing as above.    Physical Exam   Vital Signs: Temp: 98  F (36.7  C) Temp src: Oral BP: (!) 143/77 Pulse: 61   Resp: 16 SpO2: 100 % O2 Device: None (Room air)    Weight: 210 lbs 0 oz     GENERAL: awake, alert, in no acute distress at the time of my examination, very pleasant.   HEENT:  Normocephalic, atraumatic.  Pupils are " equally round and reactive to light.  Oral mucosa is moust  NECK:  Supple.  No cervical lymphadenopathy, no thyromegaly.   CHEST:  There is bilateral air entry.  No wheezing, no rales, no crackles.   CARDIOVASCULAR:  There is normal S1, S2.  Regular rate and rhythm.  No murmurs, no rubs.   ABDOMEN:  Soft, nontender, nondistended.  Bowel sounds are present.   EXTREMITIES:  There is no leg swelling, no calf tenderness, 2+ peripheral pulses are palpable.   SKIN:  Intact, no rash, no cyanosis.   NEUROLOGIC:  The patient is awake, alert and oriented to self, place and time.  There are no focal neurological deficits.   PSYCHIATRIC:  Normal mood, normal affect.   MUSCULOSKELETAL:  She moves all extremities freely.  There are no obvious joint deformities.     Data   Recent Labs   Lab 09/19/20  0544 09/18/20  0847 09/16/20  1438   WBC 7.8 7.2 9.3   HGB 11.7 13.0 14.0   MCV 90 88 89    360 395    134 138   POTASSIUM 3.5 3.3* 3.5   CHLORIDE 109 103 104   CO2 29 25 26   BUN 7 9 15   CR 0.79 0.82 0.78   ANIONGAP 2* 6 8   CRUZ 8.6 8.7 9.5   * 122* 123*   ALBUMIN  --  3.5 4.1   PROTTOTAL  --  7.7 8.8   BILITOTAL  --  1.3 0.5   ALKPHOS  --  81 116   ALT  --  19 21   AST  --  19 20   LIPASE  --  132 110     Recent Results (from the past 24 hour(s))   US Pelvic Complete with Transvaginal    Narrative    ULTRASOUND PELVIC COMPLETE WITH TRANSVAGINAL IMAGING  9/19/2020 1:38  PM    CLINICAL HISTORY: Vaginal bleeding.    TECHNIQUE: Transabdominal scans were performed. Endovaginal ultrasound  was performed to better visualize the adnexa.    COMPARISON: CT 9/16/2020    FINDINGS:    UTERUS: 8.8 x 5.2 x 5.0 cm. There are at least three heterogeneous  hypoechoic masses demonstrated in the uterus measuring up to 0.2 cm  that are compatible with fibroids. These all appear to be subserosal.    ENDOMETRIUM: 6 mm. Normal smooth endometrium.    RIGHT OVARY: Not visualized due to intestinal gas.    LEFT OVARY: 2.0 x 1.9 x 1.8 cm.  Normal with flow demonstrated.    No significant free fluid.      Impression    IMPRESSION:  1.  Multiple uterine fibroids.    ALEXANDRIA POLLARD MD     Medications     0.9% sodium chloride + KCl 20 mEq/L 100 mL/hr at 09/18/20 1811       ciprofloxacin  400 mg Intravenous Q12H     metroNIDAZOLE  500 mg Intravenous Q8H     nicotine  1 patch Transdermal Daily     nicotine   Transdermal Q8H     sodium chloride (PF)  3 mL Intracatheter Q8H

## 2020-09-19 NOTE — PLAN OF CARE
Pt A/Ox4, VSS on RA, oxycodone given for pain, CMS intact, independent in room, zofran given for nausea, awaiting ultrasound, will continue to monitor

## 2020-09-19 NOTE — PLAN OF CARE
A/OX4,cms intact,tolerating clear.No nausea and no BM today.IVF infusing,up independent in room.Tylenol given for headache.Abdominal discomfort but refused pain meds.Will continue to monitor.

## 2020-09-20 VITALS
HEART RATE: 65 BPM | HEIGHT: 65 IN | OXYGEN SATURATION: 100 % | RESPIRATION RATE: 16 BRPM | DIASTOLIC BLOOD PRESSURE: 86 MMHG | TEMPERATURE: 98.2 F | BODY MASS INDEX: 34.99 KG/M2 | SYSTOLIC BLOOD PRESSURE: 154 MMHG | WEIGHT: 210 LBS

## 2020-09-20 PROCEDURE — 96376 TX/PRO/DX INJ SAME DRUG ADON: CPT

## 2020-09-20 PROCEDURE — 25000132 ZZH RX MED GY IP 250 OP 250 PS 637: Performed by: INTERNAL MEDICINE

## 2020-09-20 PROCEDURE — 25000128 H RX IP 250 OP 636: Performed by: INTERNAL MEDICINE

## 2020-09-20 PROCEDURE — 99217 ZZC OBSERVATION CARE DISCHARGE: CPT | Performed by: INTERNAL MEDICINE

## 2020-09-20 PROCEDURE — G0378 HOSPITAL OBSERVATION PER HR: HCPCS

## 2020-09-20 RX ORDER — METRONIDAZOLE 250 MG/1
250 TABLET ORAL ONCE
Status: DISCONTINUED | OUTPATIENT
Start: 2020-09-20 | End: 2020-09-20

## 2020-09-20 RX ORDER — CIPROFLOXACIN 500 MG/1
500 TABLET, FILM COATED ORAL ONCE
Status: COMPLETED | OUTPATIENT
Start: 2020-09-20 | End: 2020-09-20

## 2020-09-20 RX ORDER — METRONIDAZOLE 500 MG/1
500 TABLET ORAL ONCE
Status: COMPLETED | OUTPATIENT
Start: 2020-09-20 | End: 2020-09-20

## 2020-09-20 RX ORDER — METRONIDAZOLE 500 MG/1
500 TABLET ORAL 3 TIMES DAILY
Qty: 15 TABLET | Refills: 0 | Status: SHIPPED | OUTPATIENT
Start: 2020-09-20 | End: 2020-09-25

## 2020-09-20 RX ORDER — CIPROFLOXACIN 500 MG/1
500 TABLET, FILM COATED ORAL 2 TIMES DAILY
Qty: 10 TABLET | Refills: 0 | Status: SHIPPED | OUTPATIENT
Start: 2020-09-20 | End: 2020-09-25

## 2020-09-20 RX ADMIN — METRONIDAZOLE 500 MG: 500 TABLET, FILM COATED ORAL at 13:42

## 2020-09-20 RX ADMIN — METRONIDAZOLE 500 MG: 500 INJECTION, SOLUTION INTRAVENOUS at 05:33

## 2020-09-20 RX ADMIN — CIPROFLOXACIN HYDROCHLORIDE 500 MG: 500 TABLET, FILM COATED ORAL at 13:41

## 2020-09-20 RX ADMIN — CIPROFLOXACIN 400 MG: 2 INJECTION, SOLUTION INTRAVENOUS at 00:45

## 2020-09-20 RX ADMIN — ACETAMINOPHEN 650 MG: 325 TABLET, FILM COATED ORAL at 09:11

## 2020-09-20 RX ADMIN — ACETAMINOPHEN 650 MG: 325 TABLET, FILM COATED ORAL at 00:45

## 2020-09-20 RX ADMIN — NICOTINE 1 PATCH: 14 PATCH, EXTENDED RELEASE TRANSDERMAL at 08:26

## 2020-09-20 NOTE — PLAN OF CARE
A/OX4,cms intact.Abdomen discomfort but refused pain medication.Up independent.IV removed.tolerating full liquid diet.Pt is discharging home,all discharge meds and papers given to the pt.Going home on oral antibiotics.All belongings return to pt.

## 2020-09-20 NOTE — PLAN OF CARE
Pt A/Ox4, VSS on RA, denies pain meds, CMS intact, independent in room, no nausea, possible discharge today, will continue to monitor

## 2020-09-20 NOTE — DISCHARGE SUMMARY
[facility]  Hospitalist Discharge Summary      Date of Admission:  9/18/2020  Date of Discharge:  9/20/2020  2:03 PM  Discharging Provider: Zulma Adair MD      Discharge Diagnoses   Acute colitis  Hypokalemia  Vaginal bleeding  Uterine Fibroids  Tobacco use disorder    Follow-ups Needed After Discharge   Follow-up Appointments     Follow-up and recommended labs and tests       Follow up with primary care provider, Ana Cleaning, within 7 days for   hospital follow- up.  GYN referral recommended.           Unresulted Labs Ordered in the Past 30 Days of this Admission     No orders found from 8/19/2020 to 9/19/2020.      None    Discharge Disposition   Discharged to home  Condition at discharge: Good      Hospital Course   Mrs. Anna Martinez is an extremely pleasant 55-year-old female with a past medical history of hyperthyroidism/Graves' disease status post iodine intake, who presented for evaluation of abdominal pain, nausea, vomiting, and she also reports having vaginal bleeding for the last few days; for a detailed HPI- please refer to H&P done by myself on 09/18/2020.    1.  Acute colitis  - unclear etiology, infectious versus inflammatory colitis.   - started having abdominal cramping last Saturday, and then she started having nausea, multiple episodes of emesis and multiple episodes of diarrhea, starting Tuesday.  This is her second ER visit.  Initially, 2 days ago in the ER, her blood work was unremarkable, and CT of the abdomen and pelvis showed diffuse colonic inflammation, consistent with infectious or inflammatory colitis.  She denies sick contacts.  She was discharged home from ER.  At that time she was feeling better after initial intervention in the ER.  At home, she continued to have increased abdominal cramping, nausea, dry heaving and diarrhea and she returned to ER.  - BW- pretty unremarkable; K 3.3; LA 1.4  - no fever, no leukocytosis  - Given her ongoing symptoms, started  on IV Cipro and IV Flagyl on admission; also started on iv fluids, antiemetics prn, clear liquid diet with plan to advance as tolerated  - she started feeling better, nausea, abdominal pain and diarrhea improved  - diet was advanced and she tolerated it well  - she was discharged on Cipro po and Flagyl po for 5 more days.     2.  Hypokalemia.  - K 3.3 on admission   - K replaced as per protocol     3.  History of hyperthyroidism.   - TSH is normal at 1.35.   - not on any medications currently.      4.  Vaginal bleeding  5.  Fibroids   She reports being in menopause for almost 1 year.  She started having heavy vaginal bleeding last Saturday associated with this abdominal cramping.  She reported vaginal bleeding actually is slowing down now.  - CT of the abdomen and pelvis few days ago showed multiple small uterine fibroids.  No pelvic mass.   - US pelvis- Multiple uterine fibroids.  - states that the vaginal bleeding slowed brennon, almost stopped  - Hb 14--13--11.7 today; hemodilution likely contributing   - follow up with GYN as outpatient      6.  Tobacco use disorder.   - strongly encouraged her to quit smoking.  - nicotine patch ordered in the hospital    Consultations This Hospital Stay   None    Code Status   Full Code    Time Spent on this Encounter   I, Zulma Adair MD, personally saw the patient today and spent less than or equal to 30 minutes discharging this patient.       Zulma Adair MD  [facility]  ______________________________________________________________________    Physical Exam   Vital Signs: Temp: 98.2  F (36.8  C) Temp src: Oral BP: (!) 154/86 Pulse: 65   Resp: 16 SpO2: 100 % O2 Device: None (Room air)    Weight: 210 lbs 0 oz     GENERAL: awake, alert, in no acute distress at the time of my examination, very pleasant.   HEENT:  Normocephalic, atraumatic.  Pupils are equally round and reactive to light.  Oral mucosa is moust  NECK:  Supple.  No cervical  lymphadenopathy, no thyromegaly.   CHEST:  There is bilateral air entry.  No wheezing, no rales, no crackles.   CARDIOVASCULAR:  There is normal S1, S2.  Regular rate and rhythm.  No murmurs, no rubs.   ABDOMEN:  Soft, nontender, nondistended.  Bowel sounds are present.   EXTREMITIES:  There is no leg swelling, no calf tenderness, 2+ peripheral pulses are palpable.   SKIN:  Intact, no rash, no cyanosis.   NEUROLOGIC:  The patient is awake, alert and oriented to self, place and time.  There are no focal neurological deficits.   PSYCHIATRIC:  Normal mood, normal affect.   MUSCULOSKELETAL:  She moves all extremities freely.  There are no obvious joint deformities.        Primary Care Physician   Ana Cleaning    Discharge Orders      Reason for your hospital stay    You were admitted for evaluation of nausea, vomiting, diarrhea and abdominal pain; you were found to have colitis; you were started on iv fluids, anti-nausea medications and antibiotics. Your condition improved and you were able to tolerate diet at the time of the discharge.  You recently had some vaginal bleeding and pelvic US showed fibroids; you should follow up with PMD and GYN after discharge.     Follow-up and recommended labs and tests     Follow up with primary care provider, Ana Cleaning, within 7 days for hospital follow- up.  GYN referral recommended.     Activity    Your activity upon discharge: activity as tolerated     When to contact your care team    Call your primary doctor or return to ER if you have any of the following: temperature greater than 100.5 or less than 96, chills, worsening abdominal pain, worsening nausea, vomiting or diarrhea, dizziness, loss of consciousness.     Full Code     Diet    Follow this diet upon discharge: Orders Placed This Encounter     Advance Diet as Tolerated       Significant Results and Procedures   Most Recent 3 CBC's:  Recent Labs   Lab Test 09/19/20  0544 09/18/20  0847 09/16/20  1438   WBC 7.8 7.2 9.3    HGB 11.7 13.0 14.0   MCV 90 88 89    360 395     Most Recent 3 BMP's:  Recent Labs   Lab Test 09/19/20  0544 09/18/20  0847 09/16/20  1438    134 138   POTASSIUM 3.5 3.3* 3.5   CHLORIDE 109 103 104   CO2 29 25 26   BUN 7 9 15   CR 0.79 0.82 0.78   ANIONGAP 2* 6 8   CRUZ 8.6 8.7 9.5   * 122* 123*     Most Recent 2 LFT's:  Recent Labs   Lab Test 09/18/20  0847 09/16/20  1438   AST 19 20   ALT 19 21   ALKPHOS 81 116   BILITOTAL 1.3 0.5     Most Recent TSH and T4:  Recent Labs   Lab Test 09/16/20  1438 05/19/16  1815   TSH 1.35 0.18*   T4  --  0.87     Most Recent Urinalysis:No lab results found.,   Results for orders placed or performed during the hospital encounter of 09/18/20   US Pelvic Complete with Transvaginal    Narrative    ULTRASOUND PELVIC COMPLETE WITH TRANSVAGINAL IMAGING  9/19/2020 1:38  PM    CLINICAL HISTORY: Vaginal bleeding.    TECHNIQUE: Transabdominal scans were performed. Endovaginal ultrasound  was performed to better visualize the adnexa.    COMPARISON: CT 9/16/2020    FINDINGS:    UTERUS: 8.8 x 5.2 x 5.0 cm. There are at least three heterogeneous  hypoechoic masses demonstrated in the uterus measuring up to 0.2 cm  that are compatible with fibroids. These all appear to be subserosal.    ENDOMETRIUM: 6 mm. Normal smooth endometrium.    RIGHT OVARY: Not visualized due to intestinal gas.    LEFT OVARY: 2.0 x 1.9 x 1.8 cm. Normal with flow demonstrated.    No significant free fluid.      Impression    IMPRESSION:  1.  Multiple uterine fibroids.    ALEXANDRIA POLLARD MD       Discharge Medications   Current Discharge Medication List      START taking these medications    Details   acetaminophen (TYLENOL) 500 MG tablet Take 2 tablets (1,000 mg) by mouth every 6 hours as needed for mild pain  Qty: 30 tablet, Refills: 0      ciprofloxacin (CIPRO) 500 MG tablet Take 1 tablet (500 mg) by mouth 2 times daily for 5 days  Qty: 10 tablet, Refills: 0    Associated Diagnoses: Colitis       metroNIDAZOLE (FLAGYL) 500 MG tablet Take 1 tablet (500 mg) by mouth 3 times daily for 5 days  Qty: 15 tablet, Refills: 0    Associated Diagnoses: Colitis         CONTINUE these medications which have NOT CHANGED    Details   Multiple Vitamins-Minerals (CENTRUM) TABS Take one tablet by mouth once daily  Qty: 30 tablet, Refills: 0    Associated Diagnoses: Dietary deficiency           Allergies   No Known Allergies

## 2023-11-10 NOTE — DISCHARGE INSTRUCTIONS
As we discussed, you do have colitis.  It is possible this is an infectious colitis, and since your symptoms have persisted you have been given antibiotics.  Please do follow with your regular doctor as you may need a referral for a colonoscopy to evaluate for other types of colitis as well.  Come back to the ER with any other concerns.   Thank you for coming to [facility]. [facility] emergency department! You were seen today for fall, you were diagnosed with ankle fracture. Please leave your boot on when walking, please use crutches do not put weight on your ankle. Follow-up with orthopedic surgery within the week. For pain take Tylenol and Motrin. Follow up with your primary care doctor. If you have any worsening of symptoms or any other concerns, please return to the emergency department. We are always available!

## 2024-09-05 ENCOUNTER — HOSPITAL ENCOUNTER (OUTPATIENT)
Facility: CLINIC | Age: 59
Setting detail: OBSERVATION
Discharge: HOME | End: 2024-09-06
Attending: STUDENT IN AN ORGANIZED HEALTH CARE EDUCATION/TRAINING PROGRAM | Admitting: STUDENT IN AN ORGANIZED HEALTH CARE EDUCATION/TRAINING PROGRAM
Payer: COMMERCIAL

## 2024-09-05 DIAGNOSIS — F41.9 ANXIETY: ICD-10-CM

## 2024-09-05 DIAGNOSIS — F33.2 SEVERE EPISODE OF RECURRENT MAJOR DEPRESSIVE DISORDER, WITHOUT PSYCHOTIC FEATURES (H): Primary | ICD-10-CM

## 2024-09-05 PROBLEM — F32.9 MAJOR DEPRESSION: Status: ACTIVE | Noted: 2024-09-05

## 2024-09-05 PROCEDURE — 99223 1ST HOSP IP/OBS HIGH 75: CPT | Performed by: NURSE PRACTITIONER

## 2024-09-05 PROCEDURE — 250N000013 HC RX MED GY IP 250 OP 250 PS 637: Performed by: NURSE PRACTITIONER

## 2024-09-05 PROCEDURE — G0378 HOSPITAL OBSERVATION PER HR: HCPCS

## 2024-09-05 PROCEDURE — 99285 EMERGENCY DEPT VISIT HI MDM: CPT

## 2024-09-05 RX ORDER — CLONIDINE HYDROCHLORIDE 0.1 MG/1
0.1 TABLET ORAL 4 TIMES DAILY PRN
Status: DISCONTINUED | OUTPATIENT
Start: 2024-09-05 | End: 2024-09-06 | Stop reason: HOSPADM

## 2024-09-05 RX ORDER — ESCITALOPRAM OXALATE 20 MG/1
20 TABLET ORAL DAILY
COMMUNITY
End: 2024-09-06

## 2024-09-05 RX ORDER — ESCITALOPRAM OXALATE 10 MG/1
10 TABLET ORAL DAILY
Status: DISCONTINUED | OUTPATIENT
Start: 2024-09-05 | End: 2024-09-05

## 2024-09-05 RX ORDER — MIRTAZAPINE 7.5 MG/1
7.5 TABLET, FILM COATED ORAL AT BEDTIME
COMMUNITY
End: 2024-09-06

## 2024-09-05 RX ORDER — OLANZAPINE 5 MG/1
5 TABLET, ORALLY DISINTEGRATING ORAL 4 TIMES DAILY PRN
Status: DISCONTINUED | OUTPATIENT
Start: 2024-09-05 | End: 2024-09-06 | Stop reason: HOSPADM

## 2024-09-05 RX ORDER — TRAZODONE HYDROCHLORIDE 50 MG/1
50 TABLET, FILM COATED ORAL
Status: DISCONTINUED | OUTPATIENT
Start: 2024-09-05 | End: 2024-09-05

## 2024-09-05 RX ORDER — ONDANSETRON 4 MG/1
4 TABLET, ORALLY DISINTEGRATING ORAL EVERY 6 HOURS PRN
Status: DISCONTINUED | OUTPATIENT
Start: 2024-09-05 | End: 2024-09-06 | Stop reason: HOSPADM

## 2024-09-05 RX ORDER — HYDROXYZINE HYDROCHLORIDE 25 MG/1
25 TABLET, FILM COATED ORAL EVERY 6 HOURS PRN
Status: DISCONTINUED | OUTPATIENT
Start: 2024-09-05 | End: 2024-09-06 | Stop reason: HOSPADM

## 2024-09-05 RX ORDER — ACETAMINOPHEN 325 MG/1
650 TABLET ORAL EVERY 4 HOURS PRN
Status: DISCONTINUED | OUTPATIENT
Start: 2024-09-05 | End: 2024-09-06 | Stop reason: HOSPADM

## 2024-09-05 RX ORDER — MIRTAZAPINE 7.5 MG/1
7.5 TABLET, FILM COATED ORAL AT BEDTIME
Status: DISCONTINUED | OUTPATIENT
Start: 2024-09-05 | End: 2024-09-06 | Stop reason: HOSPADM

## 2024-09-05 RX ORDER — ESCITALOPRAM OXALATE 10 MG/1
10 TABLET ORAL DAILY
Status: DISCONTINUED | OUTPATIENT
Start: 2024-09-05 | End: 2024-09-06 | Stop reason: HOSPADM

## 2024-09-05 RX ORDER — TRAZODONE HYDROCHLORIDE 100 MG/1
100 TABLET ORAL
Status: DISCONTINUED | OUTPATIENT
Start: 2024-09-05 | End: 2024-09-05

## 2024-09-05 RX ORDER — TRAZODONE HYDROCHLORIDE 150 MG/1
150 TABLET ORAL
COMMUNITY
End: 2024-09-06

## 2024-09-05 RX ORDER — IBUPROFEN 600 MG/1
600 TABLET, FILM COATED ORAL EVERY 6 HOURS PRN
Status: DISCONTINUED | OUTPATIENT
Start: 2024-09-05 | End: 2024-09-06 | Stop reason: HOSPADM

## 2024-09-05 RX ORDER — TRAZODONE HYDROCHLORIDE 50 MG/1
50 TABLET, FILM COATED ORAL
Status: DISCONTINUED | OUTPATIENT
Start: 2024-09-05 | End: 2024-09-06 | Stop reason: HOSPADM

## 2024-09-05 RX ORDER — OLANZAPINE 10 MG/2ML
10 INJECTION, POWDER, FOR SOLUTION INTRAMUSCULAR 2 TIMES DAILY PRN
Status: DISCONTINUED | OUTPATIENT
Start: 2024-09-05 | End: 2024-09-06 | Stop reason: HOSPADM

## 2024-09-05 RX ADMIN — ESCITALOPRAM OXALATE 10 MG: 10 TABLET, FILM COATED ORAL at 18:33

## 2024-09-05 RX ADMIN — HYDROXYZINE HYDROCHLORIDE 25 MG: 25 TABLET, FILM COATED ORAL at 18:33

## 2024-09-05 RX ADMIN — TRAZODONE HYDROCHLORIDE 50 MG: 50 TABLET ORAL at 22:30

## 2024-09-05 RX ADMIN — MIRTAZAPINE 7.5 MG: 7.5 TABLET, FILM COATED ORAL at 21:31

## 2024-09-05 ASSESSMENT — ACTIVITIES OF DAILY LIVING (ADL)
ADLS_ACUITY_SCORE: 35

## 2024-09-05 ASSESSMENT — COLUMBIA-SUICIDE SEVERITY RATING SCALE - C-SSRS
6. HAVE YOU EVER DONE ANYTHING, STARTED TO DO ANYTHING, OR PREPARED TO DO ANYTHING TO END YOUR LIFE?: NO
2. HAVE YOU ACTUALLY HAD ANY THOUGHTS OF KILLING YOURSELF IN THE PAST MONTH?: NO
1. IN THE PAST MONTH, HAVE YOU WISHED YOU WERE DEAD OR WISHED YOU COULD GO TO SLEEP AND NOT WAKE UP?: NO

## 2024-09-05 NOTE — ED PROVIDER NOTES
[facility] Unit - Initial Psychiatric Observation Note  [facility] Emergency Department  Observation Initiation Date: Sep 5, 2024    Anna Martinez MRN: [id number]   Age: 59 year old YOB: 1965     History     Chief Complaint   Patient presents with    Depression     HPI  Anna Martinez is a 59 year old female with a past history notable for major depressive disorder, significant childhood trauma, and anxiety who presented to the emergency room voluntarily today with emotional dysregulation in the presence of heightened psychosocial stressors and non-adherence with the recommended plan of care. She was cleared medically and transferred to [facility] for additional assessment and treatment planning. Today, 9/5/24 she is nearing 6 hours in emergency care.    Please see the [facility] assessment & reassessment (if available), ED physician notes and nursing notes for additional information and collateral content if available. All were reviewed prior to meeting with the patient. Pertinent content includes the following:  Pt with ongoing work-related injuries and stress related to processes associated with the injuries. Pt's mother and aunt spent years in psychiatric institutions, per report, her aunt has been institutionalized since age 13. She is scheduled to start PHP at [facility] on Monday, 9/9/24.    From the [facility] assessment today:    Pt is a 59 year-old female with notable history for depression, anxiety, and unresolved PTSD. Pt shares multiple stressors and past traumatic events, starting in childhood, and denies history of working with psychiatry or therapist. Pt denies any history of past suicide attempts, suicidal ideation, self-harm, HI, MARY concerns, or signs of kaylee or psychosis. Pt does share family history significant for schizophrenia, bipolar, and ADHD, noting her mom had both schizophrenia and bipolar. Pt endorses aversion to psychotropic medications due to seeing her mom and aunt overly medicated as a  "child. Pt is help-seeking, future-oriented, and moderately open to starting medications.     Clinical Summary and Substantiation of Recommendations   Pt presents to the ED due to increased depression and anxiety, and worsening psychosocial stressors that have been difficult to manage. Pt is help-seeking and future-oriented, and open to restarting medications. Pt will benefit from remaining on [facility] under observation, to help stabilize current acute symptoms. Pt will meet with treatment members tomorrow to determine if she would benefit from an additional night at [facility], or is able to discharge home while waiting to start already scheduled PHP at [facility] on Monday.    On approach today, Anna is found sitting in a chair in the milieu. She readily agreed to an interview in a conference room. She was actively engaged and appeared to be a reliable historian. She was tearful throughout the interview, and intermittently emotionally dysregulated. She was able to engage with me through some deep breathing exercises and we were able to complete the interview. She denies SI/HI, A/V hallucinations, delusions or paranoia. She endorses anhedonia, disrupted sleep, lack of appetite, excessive worry, feeling down, depressed, and hopeless, has difficulty concentrating and paying attention. She stated \"This is not me; this is not who I am. I get knocked down, but I always get up again, and I can't get back up right now.\" She reports that she has never taken medications consistently because of what she witnessed with her mother and maternal aunt who spent much of their lives in institutions, which she described \"as zombies\". We spent a significant amount of time on psychopharmacology education, including general mechanism of actions, timing and monitoring. She reports no appreciable side effects from Lexapro when she took it, which she did not do consistently, and no appreciable side-effects from any other medications she can recall. " She is scheduled to start [facility] PHP starting on Monday, which she is looking forward to.    Past Medical History  Past Medical History:   Diagnosis Date    Hyperthyroidism      No past surgical history on file.  escitalopram (LEXAPRO) 20 MG tablet  mirtazapine (REMERON) 7.5 MG tablet  traZODone (DESYREL) 150 MG tablet  Multiple Vitamins-Minerals (CENTRUM) TABS      No Known Allergies  Family History  No family history on file.  Social History   Social History     Tobacco Use    Smoking status: Light Smoker     Types: Cigars, Pipe    Smokeless tobacco: Never    Tobacco comments:     Hookah on weekends   Substance Use Topics    Alcohol use: No     Comment: Has not had a drink since Feb 2016 (5/2016)    Drug use: No          Review of Systems  A medically appropriate review of systems was performed with pertinent positives and negatives noted in the HPI, and all other systems negative.    Physical Examination   BP: (!) 164/85  Pulse: 78  Temp: 97.8  F (36.6  C)  Resp: 18  SpO2: 100 %    Physical Exam  General: Appears stated age.   Neuro: Alert and fully oriented. Extremities appear to demonstrate normal strength on visual inspection.   Integumentary/Skin: no rash visualized, normal color    Psychiatric Examination   Appearance: awake, alert  Attitude:  cooperative  Eye Contact:  fair  Mood:  anxious and depressed  Affect:  mood congruent and intensity is heightened  Speech:  clear, coherent  Psychomotor Behavior:  no evidence of tardive dyskinesia, dystonia, or tics  Thought Process:  goal oriented  Associations:  no loose associations  Thought Content:  no evidence of suicidal ideation or homicidal ideation and no evidence of psychotic thought  Insight:  good  Judgement:  intact  Oriented to:  time, person, and place  Attention Span and Concentration:  fair  Recent and Remote Memory:  intact  Language: able to name/identify objects without impairment  Fund of Knowledge: intact with awareness of current and past  "events    ED Course        Labs Ordered and Resulted from Time of ED Arrival to Time of ED Departure - No data to display    Assessments & Plan (with Medical Decision Making)   Patient presenting with an increase in anxiety and depression in the presence of a history of significant childhood trauma and heightened psychosocial stressors. Anna has been reluctant to stay on any medications consistently owing to her experiences watching institutionalized family members who appeared to be over-medicated. She reported, \"I don't ever want to be like that, so I've been scared to take medications.\" After extensive psychopharmacologic education, she expressed an understanding of the general mechanism of action and timing of medications used to treat anxiety and depression. Through a shared decision-making process, a plan of care was developed as is noted below.. Nursing notes reviewed noting no acute issues.     I have reviewed the assessment completed by the [facility].     During the observation period, the patient did not require medications for agitation, and did not require restraints/seclusion for patient and/or provider safety.     The patient was found to have a psychiatric condition that would benefit from an observation stay in the emergency department for further psychiatric stabilization and/or coordination of a safe disposition. The observation plan includes serial assessments of psychiatric condition, potential administration of medications if indicated, further disposition pending the patient's psychiatric course during the monitoring period.     Preliminary diagnosis:    ICD-10-CM    1. Severe episode of recurrent major depressive disorder, without psychotic features (H)  F33.2       2. Anxiety  F41.9            Treatment Plan:  - Admit overnight for Observation.  - Will start escitalopram 10 mg daily to target anxiety and depression symptoms; her last dose was 20 mg when she stopped. She appreciated no untoward " side effects from the medication in the past.  - Will continue mirtazapine 7.5 mg daily to augment antidepressant treatment and to help with sleep.  - [facility] standing orders for pain, anxiety, agitation, insomnia and nausea.  -Medication education provided this visit includes, rationale for medication, importance of compliance, medication interactions, and common side effects.   -Supportive psychotherapy provided regarding patient's stressors and past mental health symptoms problem solving ways to improve overall wellness and cope with acute and chronic mental health symptoms.  -Observe overnight and reassess tomorrow.  - Anticipate attending [facility] PHP starting on Monday.    --  DELICIA Silverman CNP   [facility] EMERGENCY DEPT  [facility] Unit      Yuli eDnny APRN CNP  09/05/24 0650

## 2024-09-05 NOTE — CONSULTS
Diagnostic Evaluation Consultation  Crisis Assessment    Patient Name: Anna Martinez  Age:  59 year old  Legal Sex: female  Gender Identity: female  Pronouns:   Race: Black or   Ethnicity: Not  or   Language: English      Patient was assessed: In person   Crisis Assessment Start Date: 09/05/24  Crisis Assessment Start Time: 1514  Crisis Assessment Stop Time: 1600  Patient location: [facility] EMERGENCY DEPT                             [id number]    Referral Data and Chief Complaint  Anna Martinez presents to the ED per community partner(s) (referred by [facility]). Patient is presenting to the ED for the following concerns: Depression, Worsening psychosocial stress.   Factors that make the mental health crisis life threatening or complex are:  Pt presents to the ED today after being referred by [facility]; pt reports she has been working with [facility] for several months due to worsening psychosocial and work stressors. Pt endorses worsening depression and anxiety, and notes she is supposed to start a PHP program on Monday at [facility], but is worried about seeing being she has worked with previously. Pt is tearful and sad throughout the assessment, sharing multiple past stressors and traumas that are increasingly difficult to manage. Pt unable to identify specific event that led to ED visit today, but rather shares a collection of stressors that she is unable to manage. Pt denies any imminent safety concerns, including denying SI, NSSIB, HI, AH, or VH. Pt does share she ran out of recent mdications, and is also wanting to talk with a provider about medication options.      Informed Consent and Assessment Methods  Explained the crisis assessment process, including applicable information disclosures and limits to confidentiality, assessed understanding of the process, and obtained consent to proceed with the assessment.  Assessment methods included conducting a formal interview with patient,  review of medical records, collaboration with medical staff, and obtaining relevant collateral information from family and community providers when available.     Patient response to interventions: eager to participate, verbalizes understanding  Coping skills were attempted to reduce the crisis:  Utilized [facility] crisis stabilization; came to ED     History of the Crisis   Pt is a 59 year-old female with notable history for depression, anxiety, and unresolved PTSD. Pt shares multiple stressors and past traumatic events, starting in childhood, and denies history of working with psychiatry or therapist. Pt denies any history of past suicide attempts, suicidal ideation, self-harm, HI, MARY concerns, or signs of kaylee or psychosis. Pt does share family history significant for schizophrenia, bipolar, and ADHD, noting her mom had both schizophrenia and bipolar. Pt endorses aversion to psychotropic medications due to seeing her mom and aunt overly medicated as a child. Pt is help-seeking, future-oriented, and moderately open to starting medications.    Brief Psychosocial History  Family:  Single, Children yes  Support System:  Friend (friend/coworker identified as main support)  Employment Status:  employed full-time  Source of Income:  salary/wages  Financial Environmental Concerns:     Current Hobbies:  television/movies/videos (listening to podcasts)  Barriers in Personal Life:  medical conditions/precautions    Significant Clinical History  Current Anxiety Symptoms:  racing thoughts, excessive worry, shortness of breath or racing heart, anxious  Current Depression/Trauma:  sadness, helplessness, irritable, low self esteem, avoidance, difficulty concentrating, withdrawl/isolation, crying or feels like crying  Current Somatic Symptoms:  excessive worry, racing thoughts, somatic symptoms (abdominal pain, headache, tension), anxious, shortness of breath or racing heart  Current Psychosis/Thought Disturbance:  forgetful,  inattentive  Current Eating Symptoms:     Chemical Use History:  Alcohol: None  Benzodiazepines: None  Opiates: None  Cocaine: None  Marijuana: None  Other Use: None   Past diagnosis:  PTSD, Depression, Anxiety Disorder  Family history:  ADHD, Bipolar Disorder, Schizophrenia  Past treatment:  Primary Care, Other  Details of most recent treatment:  Has been working with [facility] crisis stabilization since March 2024, per review of medical records  Other relevant history:  Work injury and subsequent FMLA; had to fight for FMLA benefits       Collateral Information  Is there collateral information: No (Pt declined to have collateral contact called; no imminent safety concerns endorsed)     Risk Assessment  [place] Suicide Severity Rating Scale Full Clinical Version:  Suicidal Ideation  Q1 Wish to be Dead (Lifetime): No  Q2 Non-Specific Active Suicidal Thoughts (Lifetime): No  Q6 Suicide Behavior (Lifetime): no     Suicidal Behavior (Lifetime)  Actual Attempt (Lifetime): No  Has subject engaged in non-suicidal self-injurious behavior? (Lifetime): No  Interrupted Attempts (Lifetime): No  Aborted or Self-Interrupted Attempt (Lifetime): No  Preparatory Acts or Behavior (Lifetime): No    [place] Suicide Severity Rating Scale Recent:   Suicidal Ideation (Recent)  Q1 Wished to be Dead (Past Month): no  Q2 Suicidal Thoughts (Past Month): no  Level of Risk per Screen: no risks indicated  Intensity of Ideation (Recent)  Most Severe Ideation Rating (Past 1 Month):  (NA)  Description of Most Severe Ideation (Past 1 Month): NA  Frequency (Past 1 Month):  (NA)  Duration (Past 1 Month):  (NA)  Controllability (Past 1 Month):  (NA)  Deterrents (Past 1 Month):  (NA)  Reasons for Ideation (Past 1 Month):  (NA)  Suicidal Behavior (Recent)  Actual Attempt (Past 3 Months): No  Total Number of Actual Attempts (Past 3 Months): 0  Actual Attempt Description (Past 3 Months): NA  Has subject engaged in non-suicidal self-injurious behavior? (Past  3 Months): No  Interrupted Attempts (Past 3 Months): No  Total Number of Interrupted Attempts (Past 3 Months): 0  Interrupted Attempt Description (Past 3 Months): NA  Aborted or Self-Interrupted Attempt (Past 3 Months): No  Total Number of Aborted or Self-Interrupted Attempts (Past 3 Months): 0  Aborted or Self-Interrupted Attempt Description (Past 3 Months): NA  Preparatory Acts or Behavior (Past 3 Months): No  Total Number of Preparatory Acts (Past 3 Months): 0  Preparatory Acts or Behavior Description (Past 3 Months): NA    Environmental or Psychosocial Events: challenging interpersonal relationships, helplessness/hopelessness, other life stressors, loss of status/respect/rank, social isolation, recent life events (see comment) (significant work stress; FMLA, previous injury)  Protective Factors:      Does the patient have thoughts of harming others? Feels Like Hurting Others: no  Previous Attempt to Hurt Others: no  Is the patient engaging in sexually inappropriate behavior?: no    Is the patient engaging in sexually inappropriate behavior?  no        Mental Status Exam   Affect: Dramatic  Appearance: Appropriate  Attention Span/Concentration: Attentive  Eye Contact: Variable    Fund of Knowledge: Appropriate   Language /Speech Content: Fluent  Language /Speech Volume: Normal  Language /Speech Rate/Productions: Normal  Recent Memory: Intact  Remote Memory: Intact  Mood: Depressed, Anxious, Sad  Orientation to Person: Yes   Orientation to Place: Yes  Orientation to Time of Day: Yes  Orientation to Date: Yes     Situation (Do they understand why they are here?): Yes  Psychomotor Behavior: Normal  Thought Content: Clear  Thought Form: Intact        Medication  Psychotropic medications:   Medication Orders - Psychiatric (From admission, onward)      Start     Dose/Rate Route Frequency Ordered Stop    09/05/24 2200  mirtazapine (REMERON) tablet TABS 7.5 mg         7.5 mg Oral AT BEDTIME 09/05/24 1722      09/05/24  "1815  escitalopram (LEXAPRO) tablet 10 mg         10 mg Oral DAILY 09/05/24 1811 09/05/24 1812  OLANZapine zydis (zyPREXA) ODT tab 5 mg        Placed in \"Or\" Linked Group    5 mg Oral 4 TIMES DAILY PRN 09/05/24 1812 09/05/24 1812  OLANZapine (zyPREXA) injection 10 mg        Placed in \"Or\" Linked Group    10 mg Intramuscular 2 TIMES DAILY PRN 09/05/24 1812 09/05/24 1811  hydrOXYzine HCl (ATARAX) tablet 25 mg         25 mg Oral EVERY 6 HOURS PRN 09/05/24 1812 09/05/24 1810  traZODone (DESYREL) tablet 50 mg         50 mg Oral AT BEDTIME PRN 09/05/24 1811               Current Care Team  Patient Care Team:  No Ref-Primary, Physician as PCP - General    Diagnosis  Patient Active Problem List   Diagnosis Code    Contraception Z78.9    Hyperthyroidism E05.90    Colitis K52.9    Major depression F32.9    Anxiety F41.9    Severe episode of recurrent major depressive disorder, without psychotic features (H) F33.2       Primary Problem This Admission  Active Hospital Problems    Major depression      Anxiety      Severe episode of recurrent major depressive disorder, without psychotic features (H)      Clinical Summary and Substantiation of Recommendations   Pt presents to the ED due to increased depression and anxiety, and worsening psychosocial stressors that have been difficult to manage. Pt is help-seeking and future-oriented, and open to restarting medications. Pt will benefit from remaining on [facility] under observation, to help stabilize current acute symptoms. Pt will meet with treatment members tomorrow to determine if [facility], or is able to discharge home while waiting to start already scheduled [place] [facility] [place].      Patient coping skills attempted to reduce the crisis:  Utilized [facility] crisis stabilization; came to ED    Disposition  Recommended disposition: Observation, Programmatic Care        Reviewed case and recommendations with attending " provider. Attending Name: Yuli Denny CNP, APRN       Attending concurs with disposition: yes       Patient and/or validated legal guardian concurs with disposition:   yes       Final disposition:  observation    Legal status on admission: Voluntary/Patient has signed consent for treatment    Assessment Details   Total duration spent with the patient: 46 min     CPT code(s) utilized: 79771 - Psychotherapy for Crisis - 60 (30-74*) min    SANTIAGO VALDOVINOS, Psychotherapist  [facility] - Triage & Transition Services  Callback: [phone]

## 2024-09-05 NOTE — PLAN OF CARE
Anna Martinez  September 5, 2024  Plan of Care Hand-off Note     Patient Care Path: observation    Plan for Care:   Pt presents to the ED due to increased depression and anxiety, and worsening psychosocial stressors that have been difficult to manage. Pt is help-seeking and future-oriented, and open to restarting medications. Pt will benefit from remaining on [facility] under observation, to help stabilize current acute symptoms.     Pt will meet with treatment members tomorrow to determine if she would benefit from an additional night at [facility], or is able to discharge home while waiting to start already scheduled PHP at [facility] on Monday.    Identified Goals and Safety Issues: Stabilize depression and anxiety          Legal Status: Legal Status at Admission: Voluntary/Patient has signed consent for treatment    Psychiatry Consult: NA, remains on [facility]     Updated RN, provider regarding plan of care.           SANTIAGO VALDOVINOS

## 2024-09-05 NOTE — ED PROVIDER NOTES
Emergency Department Note      History of Present Illness     Chief Complaint   Depression      HPI   Anna Martinez is a 59 year old female presenting with feeling depressed.  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  Patient has been trying to reach out for resources but does not feel like she has been able to get the level of effects that she needs.  She denies being suicidal.  She has not tried to harm herself.  Denies alcohol and drug use.  Patient can tolerate eating and drinking.  No history of diabetes.    Independent Historian   None    Review of External Notes   none    Past Medical History     Medical History and Problem List   Past Medical History:   Diagnosis Date    Hyperthyroidism        Medications   escitalopram (LEXAPRO) 20 MG tablet  mirtazapine (REMERON) 7.5 MG tablet  traZODone (DESYREL) 150 MG tablet  Multiple Vitamins-Minerals (CENTRUM) TABS        Surgical History   No past surgical history on file.    Physical Exam     Patient Vitals for the past 24 hrs:   BP Temp Temp src Pulse Resp SpO2   09/05/24 1700 (!) 168/76 -- -- 82 18 98 %   09/05/24 1500 (!) 165/85 -- -- 86 20 98 %   09/05/24 1424 (!) 164/85 -- -- -- -- --   09/05/24 1421 -- 97.8  F (36.6  C) Temporal 78 18 100 %     Physical Exam  GENERAL: Patient tearful, but consolable.  HEAD: Atraumatic.  NECK: No rigidity  EYE: PERRL  CV: RRR, no murmurs rubs or gallops  PULM: CTAB with good aeration; no retractions, rales, rhonchi, or wheezing  ABD: Soft, nontender, nondistended, no guarding  DERM: No rash. Skin warm and dry  EXTREMITY: Moving all extremities without difficulty.  Psych: Cooperative.  Answers questions appropriately.  No active SI or hallucinations.      Diagnostics     Lab Results   Labs Ordered and Resulted from Time of ED Arrival to Time of ED Departure - No data to display    Imaging   No orders to display            ED Course      Medications Administered   Medications    mirtazapine (REMERON) tablet TABS 7.5 mg (has no administration in time range)   traZODone (DESYREL) tablet 50 mg (has no administration in time range)   escitalopram (LEXAPRO) tablet 10 mg (10 mg Oral $Given 9/5/24 1833)   acetaminophen (TYLENOL) tablet 650 mg (has no administration in time range)   ibuprofen (ADVIL/MOTRIN) tablet 600 mg (has no administration in time range)   hydrOXYzine HCl (ATARAX) tablet 25 mg (25 mg Oral $Given 9/5/24 1833)   OLANZapine zydis (zyPREXA) ODT tab 5 mg (has no administration in time range)     Or   OLANZapine (zyPREXA) injection 10 mg (has no administration in time range)   ondansetron (ZOFRAN ODT) ODT tab 4 mg (has no administration in time range)   cloNIDine (CATAPRES) tablet 0.1 mg (has no administration in time range)       Procedures   Procedures     Discussion of Management   None    ED Course        Additional Documentation  None    Medical Decision Making / Diagnosis        [facility]   Anna Martinez is a 59 year old female     Patient presents with depression.  Chronic condition complicating - depression and anxiety.  No current SI, VH, AH.   No attempt at suicide   DDx considered stress reaction, psychosis, adjustment disorder.   No acute medical concerns.  Ultimately, it appears patient is depressed due to life stressors.  Patient is a good candidate for the empath unit so was transferred to that unit.      Disposition   Sent to empath    Diagnosis     ICD-10-CM    1. Severe episode of recurrent major depressive disorder, without psychotic features (H)  F33.2       2. Anxiety  F41.9            Discharge Medications   New Prescriptions    No medications on file         MD Cheryl Singletary Kevin, MD  09/05/24 5549

## 2024-09-05 NOTE — PROGRESS NOTES
"59 year old female with history of MDD received from ED due to feeling emotionally overwhelmed and depressed. Pt reports she has experienced a lot of stress recently that has become too much to handle. 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 She reports multiple other stressors and \"lifelong trauma\" but asks not to share during intake as she breaks into tears when talking about them. Pt is not feeling suicidal. She is not currently taking medications. She states she was seen at [facility] last month and received 2 weeks of medications but did not refill them. Pt is scheduled for [facility] PHP program on Monday but did not feel she could make it until then. She is not sleeping, eating, or caring for herself at home. She called [facility] who referred her here.       Nursing and risk assessments completed. Assessments reviewed with LMHP and physician. Admission information reviewed with patient. Patient given a tour of [facility] and instructions on using the facility. Questions regarding [facility] addressed. Pt safety search completed.     "

## 2024-09-05 NOTE — Clinical Note
Anna Martinez was seen and treated in our emergency department on 9/5/2024.  She may return to work on 09/09/2024.       If you have any questions or concerns, please don't hesitate to call.      Yuli Denny, DELICIA CNP

## 2024-09-05 NOTE — ED TRIAGE NOTES
Pt feeling very depressed and sad, having many life stressors recently. Has resources and is starting a new group on Monday but feels she is so low that she can't make it until then. Denies SI and has never tried to harm herself before. Pt is tearful, very cooperative and pleasant   Has not been sleeping well recently

## 2024-09-05 NOTE — Clinical Note
Anna Martinez was seen and treated in our emergency department on 9/5/2024.              Sincerely,     [facility] Emergency Dept

## 2024-09-06 VITALS
TEMPERATURE: 97.4 F | RESPIRATION RATE: 18 BRPM | DIASTOLIC BLOOD PRESSURE: 80 MMHG | SYSTOLIC BLOOD PRESSURE: 176 MMHG | OXYGEN SATURATION: 100 % | HEART RATE: 77 BPM

## 2024-09-06 PROCEDURE — 250N000013 HC RX MED GY IP 250 OP 250 PS 637: Performed by: NURSE PRACTITIONER

## 2024-09-06 PROCEDURE — 99239 HOSP IP/OBS DSCHRG MGMT >30: CPT | Performed by: PSYCHIATRY & NEUROLOGY

## 2024-09-06 PROCEDURE — G0378 HOSPITAL OBSERVATION PER HR: HCPCS

## 2024-09-06 RX ORDER — MIRTAZAPINE 7.5 MG/1
7.5 TABLET, FILM COATED ORAL AT BEDTIME
Qty: 30 TABLET | Refills: 0 | Status: SHIPPED | OUTPATIENT
Start: 2024-09-06

## 2024-09-06 RX ORDER — MIRTAZAPINE 7.5 MG/1
1 TABLET, FILM COATED ORAL AT BEDTIME
COMMUNITY
Start: 2024-08-18 | End: 2024-09-06

## 2024-09-06 RX ORDER — ESCITALOPRAM OXALATE 10 MG/1
10 TABLET ORAL DAILY
Qty: 30 TABLET | Refills: 0 | Status: SHIPPED | OUTPATIENT
Start: 2024-09-06

## 2024-09-06 RX ORDER — TRAZODONE HYDROCHLORIDE 50 MG/1
50 TABLET, FILM COATED ORAL
Qty: 15 TABLET | Refills: 0 | Status: SHIPPED | OUTPATIENT
Start: 2024-09-06

## 2024-09-06 RX ADMIN — IBUPROFEN 600 MG: 600 TABLET ORAL at 09:58

## 2024-09-06 RX ADMIN — HYDROXYZINE HYDROCHLORIDE 25 MG: 25 TABLET, FILM COATED ORAL at 16:43

## 2024-09-06 RX ADMIN — CLONIDINE HYDROCHLORIDE 0.1 MG: 0.1 TABLET ORAL at 09:58

## 2024-09-06 RX ADMIN — ESCITALOPRAM OXALATE 10 MG: 10 TABLET, FILM COATED ORAL at 09:58

## 2024-09-06 ASSESSMENT — ACTIVITIES OF DAILY LIVING (ADL)
ADLS_ACUITY_SCORE: 35

## 2024-09-06 ASSESSMENT — COLUMBIA-SUICIDE SEVERITY RATING SCALE - C-SSRS
6. HAVE YOU EVER DONE ANYTHING, STARTED TO DO ANYTHING, OR PREPARED TO DO ANYTHING TO END YOUR LIFE?: NO
ATTEMPT SINCE LAST CONTACT: NO
TOTAL  NUMBER OF ABORTED OR SELF INTERRUPTED ATTEMPTS SINCE LAST CONTACT: NO
SUICIDE, SINCE LAST CONTACT: NO
TOTAL  NUMBER OF INTERRUPTED ATTEMPTS SINCE LAST CONTACT: NO
1. SINCE LAST CONTACT, HAVE YOU WISHED YOU WERE DEAD OR WISHED YOU COULD GO TO SLEEP AND NOT WAKE UP?: NO
2. HAVE YOU ACTUALLY HAD ANY THOUGHTS OF KILLING YOURSELF?: NO

## 2024-09-06 NOTE — ED PROVIDER NOTES
EmPATH Unit - Psychiatric Observation Discharge Summary  [facility] Emergency Department  Discharge Date: 9/6/2024    Anna Martinez MRN: [id number]   Age: 59 year old YOB: 1965     Brief HPI & Initial ED Course     Chief Complaint   Patient presents with    Depression     HPI  Anna Martinez is a 59 year old female with history notable for major depressive disorder who is currently under observation status on the Em[facility] unit, now approaching 26 hours in the emergency department.  Overnight, there were no acute issues.  On examination today, the patient reviewed with me various life stressors she has encountered.  She highlighted some conflict in the workplace as well as an injury which appeared to be contributing factors to her current depressive episode.  She is tolerating Lexapro without side effects.  She appeared hopeful and optimistic regarding gaining improvement in the upcoming days and weeks.  She is open to utilizing psychotherapy while admitting that she had not been very open to that treatment mode in the past.  She denied suicidal and homicidal thoughts.  There was no indication of psychosis.  She interprets readiness to discharge home today.        Physical Examination   BP: 137/89  Pulse: 72  Temp: 98  F (36.7  C)  Resp: 18  SpO2: 99 %    Physical Exam  General: Appears stated age.   Neuro: Alert and fully oriented. Extremities appear to demonstrate normal strength on visual inspection.   Integumentary/Skin: no rash visualized, normal color    Psychiatric Examination   Appearance: awake, alert  Attitude:  cooperative  Eye Contact:  fair  Mood:  anxious, sad , and better  Affect:  mood congruent  Speech:  clear, coherent  Psychomotor Behavior:  no evidence of tardive dyskinesia, dystonia, or tics  Thought Process:  logical and linear  Associations:  no loose associations  Thought Content:  no evidence of suicidal ideation or homicidal ideation and no evidence of psychotic  thought  Insight:  fair  Judgement:  fair  Oriented to:  time, person, and place  Attention Span and Concentration:  fair  Recent and Remote Memory:  fair  Language: able to name/identify objects without impairment  Fund of Knowledge: intact with awareness of current and past events    Results        Labs Ordered and Resulted from Time of ED Arrival to Time of ED Departure - No data to display    Observation Course   The patient was found to have a psychiatric condition that would benefit from an observation stay in the emergency department for further psychiatric stabilization and/or coordination of a safe disposition. The plan upon observation admission included serial assessments of psychiatric condition, potential administration of medications if indicated, further disposition pending the patient's psychiatric course during the monitoring period.     Serial assessments of the patient's psychiatric condition were performed. Nursing notes were reviewed. During the observation period, the patient did not require medications for agitation, and did not require restraints/seclusion for patient and/or provider safety.     After a period of working with the treatment team on the [facility] unit, the patient's mental state improved to allow a safe transition to outpatient care. After counseling on the diagnosis, work-up, and treatment plan, the patient was discharged. Close follow-up with a psychiatrist and/or therapist was recommended and community psychiatric resources were provided. Patient is to return to the ED if any urgent or potentially life-threatening concerns.     Discharge Diagnoses:   Final diagnoses:   Severe episode of recurrent major depressive disorder, without psychotic features (H)   Anxiety       Treatment Plan:  -Continue Lexapro 10 mg daily for antidepressant treatment  -Continue trazodone 50 mg nightly as needed for insomnia  -Referral for outpatient medication management appointments and  psychotherapy  -Discharge home today.      At the time of discharge, the patient's acute suicide risk was determined to be low due to the following factors: Reduction in the intensity of mood/anxiety symptoms that preceded the admission, denial of suicidal thoughts, denies feeling helpless or helpless, not currently under the influence of alcohol or illicit substances, denies experiencing command hallucinations, no immediate access to firearms. The patient's acute risk could be higher if noncompliant with their treatment plan, medications, follow-up appointments or using illicit substances or alcohol. Protective factors include: social supports, children, stable housing, employment, Islam beliefs    I spent more than 30 minutes on discharge day activities.    --  Pieter Lopez MD  [facility] EMERGENCY DEPT  EmPATH Unit       Pieter Lopez MD  09/06/24 1027

## 2024-09-06 NOTE — PROGRESS NOTES
Patient agrees to discharge plan. Discharge instructions reviewed with patient including follow-up care plan. Medications: given and reviewed with patient. Reviewed safety plan and outpatient resources. Denies SI and HI. All belongings that were brought into the hospital have been returned to patient. Escorted off the unit at 1651 accompanied by [facility] staff. Discharged to home via cab.

## 2024-09-06 NOTE — DISCHARGE INSTRUCTIONS
Mental health recommendations    Please follow-up with your outpatient team about your visit today.    2.  One of our care coordinators will reach out to you after your discharge.  If you have any questions about additional resources please call our coordinators at # [phone]    3.  Please use aftercare plan and already established coping skills and safety planning as needed.     4.  Please call 911 and/or return to the emergency department if her symptoms worsen or your safety becomes compromised.            Partial Hospitalization (PHP) Programs/Resources:    [facility]  Phone: [phone]  Location(s): [address] [address]  Website: [email or web address]    [facility]  Phone: [phone]  Location(s): Several Locations  Website: [email or web address]://[email or web address]/[email or web address]    [facility]  Phone: [phone]  Location(s): [facility]  Website: [email or web address]    [facility]  Phone: [phone]  Location(s): [facility]  Website: [email or web address]-[email or web address]-[email or web address]-[email or web address]-disorder-php-iop       Aftercare Plan    If I am feeling unsafe or I am in a crisis, I will:   Contact my established care providers   Call the National Suicide Prevention Lifeline: 206.778.5629   Go to the nearest emergency room   Call 911   Your [facility] has a mental health crisis team you can call 24/7: [facility], [phone]    Things I am able to do on my own to cope or help me feel better:   -Practice square breathing when I begin to feel anxious - in breath through the nose for the count   of 4 and the first line on the square. Out breath through the mouth for the count of 4 for the second line   of the square. Repeat to complete the square. Repeat the square as many times as needed.    Things that I am able to  "do with others to cope or help me feel better: -Use community resources, including hotline numbers, [facility] crisis and support meetings    Things I can use or do for distraction: -Distraction skills of: going for walks, watching TV, spending time outside, calling a friend or family member  -Download a meditation lotus and spend 15-20 minutes per day mediating/relaxing. Some apps to   download include: Calm, Headspace and Insight Timer. All 3 of these apps have free version    Changes I can make to support my mental health and wellness:   -Attend scheduled mental health therapy and psychiatric appointments and follow all recommendations  -Maintain a daily schedule/routine  -Practice deep breathing skills  -Abstain from all mood altering chemicals not currently prescribed to me     People in my life that I can ask for help: National [place] on Mental Illness (GAIL)  476.935.3503 or 1.888.GAIL.HELPS    Other things that are important when I m in crisis: -Commit to 30 minutes of self care daily - this can be as simple as taking a shower, going for a walk, cooking a meal, read, writing, etc        Crisis Lines  Crisis Text Line  Text 558387  You will be connected with a trained live crisis counselor to provide support.    Por espanol, texto  OSCAR a 290339 o texto a 442-AYUDAME en WhatsApp    National Hope Line  1.800.SUICIDE [0557701]      Community Resources  Fast Tracker  Linking people to mental health and substance use disorder resources  fasttrackermn.org     [place] Mental Health Warm Line  Peer to peer support  Monday thru Saturday, 12 pm to 10 pm  103.341.0671 or 0.471.538.0867  Text \"Support\" to 12103    National [place] on Mental Illness (GAIL)  064.658.4270 or 1.888.GAIL.HELPS        Mental Health Apps  My3  https://my3app.org/    VirtualHopeBox  https://[employer].org/apps/virtual-hope-box/      Additional Information  Today you were seen by a licensed mental health professional through Triage " and Transition services, [facility] ([facility])  for a crisis assessment in the Emergency Department at [facility].  It is recommended that you follow up with your established providers (psychiatrist, mental health therapist, and/or primary care doctor - as relevant) as soon as possible. Coordinators from [facility] will be calling you in the next 24-48 hours to ensure that you have the resources you need.  You can also contact [facility] coordinators directly at [phone]. You may have been scheduled for or offered an appointment with a mental health provider. [facility] maintains an extensive network of licensed behavioral health providers to connect patients with the services they need.  We do not charge providers a fee to participate in our referral network.  We match patients with providers based on a patient's specific needs, insurance coverage, and location.  Our first effort will be to refer you to a provider within your care system, and will utilize providers outside your care system as needed.

## 2024-09-06 NOTE — PROGRESS NOTES
"  Triage and Transition Services Extended Care Reassessment     Patient: Anna goes by \"Anna,\" uses she/her pronouns  Date of Service: September 6, 2024  Site of Service: [facility] EMERGENCY DEPT                             [id number]  Patient was seen yes  Mode of Assessment: In person     Reason for Reassessment: other (see comment) decrease in sx     History of Patient's Original Emergency Room Encounter: Pt is a 59 year-old female with notable history for depression, anxiety, and unresolved PTSD. Pt shares multiple stressors and past traumatic events, starting in childhood, and denies history of working with psychiatry or therapist. Pt denies any history of past suicide attempts, suicidal ideation, self-harm, HI, MARY concerns, or signs of kaylee or psychosis. Pt does share family history significant for schizophrenia, bipolar, and ADHD, noting her mom had both schizophrenia and bipolar. Pt endorses aversion to psychotropic medications due to seeing her mom and aunt overly medicated as a child. Pt is help-seeking, future-oriented, and moderately open to starting medications.      Presentation Summary: Pt presented with a tearful and at times depressed affect. Pts mood was congruent with this presentation. Pt was oriented x4. Pt was engaged and cooperative with assessment, Pt was at times tangential and somewhat hyperverbal but was redirectable. Pt endorsed some mood improvement and future oriented and motivated to engage in outpatient supports. Pt endorsed hopefulness with starting her PHP program on Monday. Pt did not endorse any SI/SIB/HI or AH/VH.    Changes Observed Since Initial Assessment: decrease in presenting symptoms    Therapeutic Interventions Provided: Engaged in safety planning, Engaged in cognitive restructuring/ reframing, looked at common cognitive distortions and challenged negative thoughts., Engaged in guided discovery, explored patient's perspectives and helped expand them " through socratic dialogue., Reviewed healthy living that supports positive mental health, including looking at sleep hygiene, regular movement, nutrition, and regular socialization., Worked on relapse prevention planning (review of stressors, early warning signs, written plan to respond to signs, and rehearse plan)., Provided positive reinforcement for progress towards goals, gains in knowledge, and application of skills previously taught., Coached on coping techniques/relaxation skills to help improve distress tolerance and managing intense emotions., Identified and practiced coping skills.    Current Symptoms: anxious, racing thoughts sadness anxious        Mental Status Exam   Affect: Appropriate  Appearance: Appropriate  Attention Span/Concentration: Attentive  Eye Contact: Engaged    Fund of Knowledge: Appropriate   Language /Speech Content: Fluent  Language /Speech Volume: Normal  Language /Speech Rate/Productions: Normal  Recent Memory: Intact  Remote Memory: Intact  Mood: Anxious, Depressed  Orientation to Person: Yes   Orientation to Place: Yes  Orientation to Time of Day: Yes  Orientation to Date: Yes     Situation (Do they understand why they are here?): Yes  Psychomotor Behavior: Normal  Thought Content: Clear  Thought Form: Intact    Treatment Objective(s) Addressed: rapport building, identifying an appropriate aftercare plan, identifying treatment goals, safety planning, assessing safety, processing feelings, identifying and practicing coping strategies    Patient Response to Interventions: acceptance expressed, verbalizes understanding    Progress Towards Goals:  Patient Reports Symptoms Are: ongoing  Patient Progress Toward Goals: is making progress  Comment: Pt has been receptive to ED interventions  Next Step to Work Toward Discharge: symptom stabilization  Symptom Stabilization Comment: Pt did not endorse any SI/SIB/HI or AH/VH    Case Management: Summary of Interaction: None at time of  assessment    C-SSRS Since Last Contact:   1. Wish to be Dead (Since Last Contact): No  2. Non-Specific Active Suicidal Thoughts (Since Last Contact): No     Actual Attempt (Since Last Contact): No  Has subject engaged in non-suicidal self-injurious behavior? (Since Last Contact): No  Interrupted Attempts (Since Last Contact): No  Aborted or Self-Interrupted Attempt (Since Last Contact): No  Preparatory Acts or Behavior (Since Last Contact): No  Suicide (Since Last Contact): No     Calculated C-SSRS Risk Score (Since Last Contact): No Risk Indicated    Plan: Final Disposition / Recommended Care Path: discharge  Plan for Care reviewed with assigned Medical Provider: yes  Plan for Care Team Review: provider, RN  Comments: DELICIA Silverman, agrees  Patient and/or validated legal guardian concurs: yes  Clinical Substantiation: At this time IP MH admission is not being recommended due to Pt not endorsing any SI/SIB/HI or AH/VH.  Pt was able to fully safety plan with writer. During current assessment Pt does not present with any modifiable risk factors that are likely to be mitigated in a hospital setting. Further, current sx and presentation are unlikely to be changed or alleviated by medication management or IP MH therapeutic interventions during a brief hospital stay. Pt does appear to be at higher risk of death by suicide accidental or intentional due to mental health hx.  At this time IP MH admission does not appear to be the most therapeutically beneficial intervention/ level of care for Pt. Pt appears to be able to use and motivated to engage in supportive mental health/ community resources.    Legal Status: Legal Status at Admission: Voluntary/Patient has signed consent for treatment    Session Status: Time session started: 1030  Time session ended: 1100  Session Duration (minutes): 30 minutes  Session Number: 1  Anticipated number of sessions or this episode of care: 1    Session Start Time: 1030  Session Stop  Time: 1100  CPT codes: 20423 - Psychotherapy (with patient) - 30 (16-37*) min  Time Spent: 30 minutes      CPT code(s) utilized: 91612 - Psychotherapy (with patient) - 30 (16-37*) min    Diagnosis:   Patient Active Problem List   Diagnosis Code    Contraception Z78.9    Hyperthyroidism E05.90    Colitis K52.9    Major depression F32.9    Anxiety F41.9    Severe episode of recurrent major depressive disorder, without psychotic features (H) F33.2       Primary Problem This Admission: Active Hospital Problems    Major depression      Anxiety      Severe episode of recurrent major depressive disorder, without psychotic features (H)        Bianka Echeverria, [facility]   Licensed Mental Health Professional (LMHP), Extended Care  [phone]

## 2024-09-06 NOTE — PROGRESS NOTES
Patient states she slept well last night and feels a bit better today. She is hopeful about starting PHP at [facility] on Monday. She c/o anxiety. She requested and received a PRN.  She denies SI. She agrees to discharge plan.

## 2024-09-06 NOTE — PROGRESS NOTES
Pt is socializing with peer at table in milieu. Pt expresses appreciation for staff and services at [facility]. She is looking forward to group later today.     BP reassessed and improved to 137/86 after Clonidine. Pt notes only mild improvement in anxiety. Observed sitting and bouncing both legs, reports this helps her self-regulate. Declines additional PRN medications at this time.

## 2024-09-06 NOTE — PROGRESS NOTES
"Pt reports getting 7 hours of uninterrupted sleep which is more than she has gotten in some time. She moved from sensory room B to table in milieu and ate 50% of breakfast. Pleasant, calm and cooperative with cares. Vitals assessed, BP elevated 184/84 vitals otherwise WNL. Compliant with scheduled lexapro. PRN clonidine given for hypertension and anxiety. Ibuprofen given for \"body soreness\" related to sleeping on mat in sensory room. Pt denies SI/HI/AH/VH. Agreeable to reassessment today.   "

## 2024-09-06 NOTE — PROGRESS NOTES
Triage & Transition Services, Extended Care     Client Name: Anna Martinez    Date: September 6, 2024    Patient was seen yes  Mode of Assessment: In person    Service Type: attended group session  Session Start Time:  1215    Session End Time: 1245  Session Length: 30  Site Location: [facility] EMERGENCY DEPT                             [id number]  Total Number ofAttendees: 7  Topic:   relationship, structured socialization   Response: able to recall/repeat info presented, cooperative with task, expressed understanding of topic, discussed personal experience with topic, listened actively     Bianka Echeverria [facility]   Licensed Mental Health Professional (LMHP), Extended Care  [phone]

## 2024-09-07 ENCOUNTER — TELEPHONE (OUTPATIENT)
Dept: BEHAVIORAL HEALTH | Facility: CLINIC | Age: 59
End: 2024-09-07
Payer: COMMERCIAL

## 2024-09-07 NOTE — TELEPHONE ENCOUNTER
Phone continued to ring without going to VM. Currently unable to complete follow-up. Closing call encounter.

## 2024-09-08 ENCOUNTER — PATIENT OUTREACH (OUTPATIENT)
Dept: CARE COORDINATION | Facility: CLINIC | Age: 59
End: 2024-09-08
Payer: COMMERCIAL

## 2024-09-08 NOTE — PROGRESS NOTES
Connected Care Resource Center Contact  [facility]/Voicemail     Clinical Data: Post-Discharge Outreach     Outreach attempted x 2.  Unable to leave message on patient's voicemail. Patient can call [facility]'s 24/7 scheduling and nurse triage phone number [phone] ([phone]) for questions/concerns and/or to schedule an appt with an [facility] provider.      Plan:  [facility] will do no further outreaches at this time.       SEDRICK Diego  Connected Care Resource [place], [facility]    *Connected Care Resource Team does NOT follow patient ongoing. Referrals are identified based on internal discharge reports and the outreach is to ensure patient has an understanding of their discharge instructions.